# Patient Record
Sex: MALE | Race: WHITE | Employment: OTHER | URBAN - METROPOLITAN AREA
[De-identification: names, ages, dates, MRNs, and addresses within clinical notes are randomized per-mention and may not be internally consistent; named-entity substitution may affect disease eponyms.]

---

## 2021-08-24 ENCOUNTER — OFFICE VISIT (OUTPATIENT)
Dept: ENT CLINIC | Age: 86
End: 2021-08-24
Payer: MEDICARE

## 2021-08-24 VITALS
OXYGEN SATURATION: 98 % | TEMPERATURE: 98.1 F | RESPIRATION RATE: 18 BRPM | HEIGHT: 72 IN | DIASTOLIC BLOOD PRESSURE: 80 MMHG | BODY MASS INDEX: 30.34 KG/M2 | HEART RATE: 75 BPM | SYSTOLIC BLOOD PRESSURE: 124 MMHG | WEIGHT: 224 LBS

## 2021-08-24 DIAGNOSIS — H90.3 SENSORINEURAL HEARING LOSS (SNHL) OF BOTH EARS: Primary | ICD-10-CM

## 2021-08-24 PROCEDURE — G8432 DEP SCR NOT DOC, RNG: HCPCS | Performed by: OTOLARYNGOLOGY

## 2021-08-24 PROCEDURE — 99203 OFFICE O/P NEW LOW 30 MIN: CPT | Performed by: OTOLARYNGOLOGY

## 2021-08-24 PROCEDURE — 1101F PT FALLS ASSESS-DOCD LE1/YR: CPT | Performed by: OTOLARYNGOLOGY

## 2021-08-24 PROCEDURE — G8417 CALC BMI ABV UP PARAM F/U: HCPCS | Performed by: OTOLARYNGOLOGY

## 2021-08-24 PROCEDURE — G8536 NO DOC ELDER MAL SCRN: HCPCS | Performed by: OTOLARYNGOLOGY

## 2021-08-24 PROCEDURE — G8427 DOCREV CUR MEDS BY ELIG CLIN: HCPCS | Performed by: OTOLARYNGOLOGY

## 2021-08-24 RX ORDER — CETIRIZINE HCL 10 MG
TABLET ORAL
COMMUNITY

## 2021-08-24 RX ORDER — DICLOFENAC SODIUM 10 MG/G
GEL TOPICAL
COMMUNITY

## 2021-08-24 RX ORDER — AMLODIPINE BESYLATE 10 MG/1
TABLET ORAL
COMMUNITY
Start: 2020-09-18

## 2021-08-24 RX ORDER — BENZONATATE 100 MG/1
CAPSULE ORAL
COMMUNITY

## 2021-08-24 RX ORDER — FLUTICASONE PROPIONATE 50 MCG
SPRAY, SUSPENSION (ML) NASAL
COMMUNITY
Start: 2021-06-12

## 2021-08-24 RX ORDER — LISINOPRIL AND HYDROCHLOROTHIAZIDE 12.5; 2 MG/1; MG/1
TABLET ORAL
COMMUNITY

## 2021-08-24 RX ORDER — CEFDINIR 300 MG/1
CAPSULE ORAL
COMMUNITY

## 2021-08-24 RX ORDER — ASPIRIN 81 MG/1
81 TABLET ORAL DAILY
COMMUNITY

## 2021-08-24 RX ORDER — VALACYCLOVIR HYDROCHLORIDE 1 G/1
TABLET, FILM COATED ORAL
COMMUNITY

## 2021-08-24 NOTE — LETTER
8/24/2021    Patient: Gisel Avelar. YOB: 1933   Date of Visit: 8/24/2021     Nikki Freeman MD  [address] [place] [address]  Via Fax: [phone]    Dear Nikki Freeman MD,      Thank you for referring Mr. Herb Leyden to [facility] for evaluation. My notes for this consultation are attached. If you have questions, please do not hesitate to call me. I look forward to following your patient along with you.       Sincerely,    Christa Morocho MD

## 2022-09-29 ENCOUNTER — TRANSCRIBE ORDER (OUTPATIENT)
Dept: SCHEDULING | Age: 87
End: 2022-09-29

## 2022-09-29 DIAGNOSIS — I50.9 HEART FAILURE, UNSPECIFIED (HCC): Primary | ICD-10-CM

## 2022-09-29 DIAGNOSIS — R06.00 DYSPNEA AND RESPIRATORY ABNORMALITY: ICD-10-CM

## 2022-09-29 DIAGNOSIS — R06.89 DYSPNEA AND RESPIRATORY ABNORMALITY: ICD-10-CM

## 2022-10-10 ENCOUNTER — HOSPITAL ENCOUNTER (OUTPATIENT)
Dept: VASCULAR SURGERY | Age: 87
Discharge: HOME | End: 2022-10-10
Payer: MEDICARE

## 2022-10-10 VITALS — WEIGHT: 225 LBS | HEIGHT: 76 IN | BODY MASS INDEX: 27.4 KG/M2

## 2022-10-10 DIAGNOSIS — I50.9 HEART FAILURE, UNSPECIFIED (HCC): ICD-10-CM

## 2022-10-10 DIAGNOSIS — R06.89 DYSPNEA AND RESPIRATORY ABNORMALITY: ICD-10-CM

## 2022-10-10 DIAGNOSIS — R06.00 DYSPNEA AND RESPIRATORY ABNORMALITY: ICD-10-CM

## 2022-10-10 LAB
ECHO AO ROOT DIAM: 4.2 CM
ECHO AO ROOT INDEX: 1.8 CM/M2
ECHO AV AREA PEAK VELOCITY: 3.6 CM2
ECHO AV AREA VTI: 4.1 CM2
ECHO AV AREA/BSA PEAK VELOCITY: 1.5 CM2/M2
ECHO AV AREA/BSA VTI: 1.8 CM2/M2
ECHO AV MEAN GRADIENT: 12 MMHG
ECHO AV MEAN VELOCITY: 1.7 M/S
ECHO AV PEAK GRADIENT: 21 MMHG
ECHO AV PEAK VELOCITY: 2.3 M/S
ECHO AV VELOCITY RATIO: 0.48
ECHO AV VTI: 47.9 CM
ECHO LA DIAMETER INDEX: 1.55 CM/M2
ECHO LA DIAMETER: 3.6 CM
ECHO LA TO AORTIC ROOT RATIO: 0.86
ECHO LA VOL 2C: 26 ML (ref 18–58)
ECHO LA VOL 4C: 44 ML (ref 18–58)
ECHO LA VOL BP: 34 ML (ref 18–58)
ECHO LA VOL/BSA BIPLANE: 15 ML/M2 (ref 16–34)
ECHO LA VOLUME AREA LENGTH: 35 ML
ECHO LA VOLUME INDEX A2C: 11 ML/M2 (ref 16–34)
ECHO LA VOLUME INDEX A4C: 19 ML/M2 (ref 16–34)
ECHO LA VOLUME INDEX AREA LENGTH: 15 ML/M2 (ref 16–34)
ECHO LV E' LATERAL VELOCITY: 7 CM/S
ECHO LV E' SEPTAL VELOCITY: 8 CM/S
ECHO LV EDV A2C: 44 ML
ECHO LV EDV A4C: 63 ML
ECHO LV EDV BP: 54 ML (ref 67–155)
ECHO LV EDV INDEX A4C: 27 ML/M2
ECHO LV EDV INDEX BP: 23 ML/M2
ECHO LV EDV NDEX A2C: 19 ML/M2
ECHO LV EJECTION FRACTION A2C: 78 %
ECHO LV EJECTION FRACTION A4C: 59 %
ECHO LV EJECTION FRACTION BIPLANE: 69 % (ref 55–100)
ECHO LV ESV A2C: 10 ML
ECHO LV ESV A4C: 26 ML
ECHO LV ESV BP: 17 ML (ref 22–58)
ECHO LV ESV INDEX A2C: 4 ML/M2
ECHO LV ESV INDEX A4C: 11 ML/M2
ECHO LV ESV INDEX BP: 7 ML/M2
ECHO LV FRACTIONAL SHORTENING: 36 % (ref 28–44)
ECHO LV GLOBAL LONGITUDINAL STRAIN (GLS): -16.1 %
ECHO LV INTERNAL DIMENSION DIASTOLE INDEX: 1.67 CM/M2
ECHO LV INTERNAL DIMENSION DIASTOLIC: 3.9 CM (ref 4.2–5.9)
ECHO LV INTERNAL DIMENSION SYSTOLIC INDEX: 1.07 CM/M2
ECHO LV INTERNAL DIMENSION SYSTOLIC: 2.5 CM
ECHO LV IVSD: 0.9 CM (ref 0.6–1)
ECHO LV MASS 2D: 122.1 G (ref 88–224)
ECHO LV MASS INDEX 2D: 52.4 G/M2 (ref 49–115)
ECHO LV POSTERIOR WALL DIASTOLIC: 1.1 CM (ref 0.6–1)
ECHO LV RELATIVE WALL THICKNESS RATIO: 0.56
ECHO LVOT AREA: 7.1 CM2
ECHO LVOT AV VTI INDEX: 0.57
ECHO LVOT DIAM: 3 CM
ECHO LVOT MEAN GRADIENT: 3 MMHG
ECHO LVOT PEAK GRADIENT: 5 MMHG
ECHO LVOT PEAK VELOCITY: 1.1 M/S
ECHO LVOT STROKE VOLUME INDEX: 83.4 ML/M2
ECHO LVOT SV: 194.3 ML
ECHO LVOT VTI: 27.5 CM
ECHO MV A VELOCITY: 1.1 M/S
ECHO MV E DECELERATION TIME (DT): 193.2 MS
ECHO MV E VELOCITY: 0.78 M/S
ECHO MV E/A RATIO: 0.71
ECHO MV E/E' LATERAL: 11.14
ECHO MV E/E' RATIO (AVERAGED): 10.45
ECHO MV E/E' SEPTAL: 9.75
ECHO RA AREA 4C: 11.6 CM2
ECHO RV FREE WALL PEAK S': 13 CM/S
ECHO RV INTERNAL DIMENSION: 3 CM
ECHO RV TAPSE: 2.5 CM (ref 1.7–?)

## 2022-10-10 PROCEDURE — 93306 TTE W/DOPPLER COMPLETE: CPT

## 2022-10-26 ENCOUNTER — TRANSCRIBE ORDER (OUTPATIENT)
Dept: SCHEDULING | Age: 87
End: 2022-10-26

## 2022-10-26 DIAGNOSIS — R60.0 LEG EDEMA: ICD-10-CM

## 2022-10-26 DIAGNOSIS — R79.89 ELEVATED D-DIMER: ICD-10-CM

## 2022-10-26 DIAGNOSIS — R06.02 SHORTNESS OF BREATH: Primary | ICD-10-CM

## 2022-10-26 DIAGNOSIS — R79.89 D-DIMER, ELEVATED: ICD-10-CM

## 2022-10-28 ENCOUNTER — HOSPITAL ENCOUNTER (OUTPATIENT)
Dept: CT IMAGING | Age: 87
Discharge: HOME | End: 2022-10-28
Payer: MEDICARE

## 2022-10-28 ENCOUNTER — TRANSCRIBE ORDER (OUTPATIENT)
Dept: REGISTRATION | Age: 87
End: 2022-10-28

## 2022-10-28 ENCOUNTER — HOSPITAL ENCOUNTER (OUTPATIENT)
Dept: VASCULAR SURGERY | Age: 87
Discharge: HOME | End: 2022-10-28
Payer: MEDICARE

## 2022-10-28 DIAGNOSIS — R79.89 POSITIVE D-DIMER: ICD-10-CM

## 2022-10-28 DIAGNOSIS — R79.89 ELEVATED D-DIMER: ICD-10-CM

## 2022-10-28 DIAGNOSIS — I82.431 EMBOLISM AND THROMBOSIS OF RIGHT POPLITEAL VEIN (HCC): ICD-10-CM

## 2022-10-28 DIAGNOSIS — R06.02 SHORTNESS OF BREATH: ICD-10-CM

## 2022-10-28 DIAGNOSIS — R79.89 D-DIMER, ELEVATED: ICD-10-CM

## 2022-10-28 DIAGNOSIS — R06.09 DYSPNEA ON EXERTION: ICD-10-CM

## 2022-10-28 DIAGNOSIS — R60.0 LEG EDEMA: ICD-10-CM

## 2022-10-28 DIAGNOSIS — I82.431 EMBOLISM AND THROMBOSIS OF RIGHT POPLITEAL VEIN (HCC): Primary | ICD-10-CM

## 2022-10-28 PROCEDURE — 71250 CT THORAX DX C-: CPT

## 2022-10-28 PROCEDURE — 74011000636 HC RX REV CODE- 636

## 2022-10-28 PROCEDURE — 71275 CT ANGIOGRAPHY CHEST: CPT

## 2022-10-28 PROCEDURE — 93970 EXTREMITY STUDY: CPT

## 2022-10-28 RX ADMIN — IOPAMIDOL 100 ML: 755 INJECTION, SOLUTION INTRAVENOUS at 13:00

## 2022-11-22 ENCOUNTER — OFFICE VISIT (OUTPATIENT)
Dept: ENT CLINIC | Age: 87
End: 2022-11-22
Payer: MEDICARE

## 2022-11-22 VITALS
WEIGHT: 225 LBS | DIASTOLIC BLOOD PRESSURE: 82 MMHG | BODY MASS INDEX: 26.57 KG/M2 | OXYGEN SATURATION: 97 % | HEIGHT: 77 IN | RESPIRATION RATE: 17 BRPM | HEART RATE: 91 BPM | SYSTOLIC BLOOD PRESSURE: 122 MMHG

## 2022-11-22 DIAGNOSIS — H61.23 BILATERAL IMPACTED CERUMEN: Primary | ICD-10-CM

## 2022-11-22 PROCEDURE — 69210 REMOVE IMPACTED EAR WAX UNI: CPT | Performed by: OTOLARYNGOLOGY

## 2022-11-22 NOTE — PROGRESS NOTES
1 year followup ear cleaning  Ears impacted AU      Procedure - Removal Impacted Cerumen    Indications: cerumen impaction    Ears are examined under microscope/headlight.  bilateral ears are cleaned using otologic curette, suction, and/or alligator forceps.     Fu 1 year

## 2023-01-24 ENCOUNTER — TRANSCRIBE ORDER (OUTPATIENT)
Dept: SCHEDULING | Age: 88
End: 2023-01-24

## 2023-01-24 ENCOUNTER — HOSPITAL ENCOUNTER (OUTPATIENT)
Dept: VASCULAR SURGERY | Age: 88
Discharge: HOME | End: 2023-01-24
Payer: MEDICARE

## 2023-01-24 DIAGNOSIS — I82.531 CHRONIC EMBOLISM AND THROMBOSIS OF RIGHT POPLITEAL VEIN (HCC): Primary | ICD-10-CM

## 2023-01-24 DIAGNOSIS — I82.531 CHRONIC EMBOLISM AND THROMBOSIS OF RIGHT POPLITEAL VEIN (HCC): ICD-10-CM

## 2023-01-24 PROCEDURE — 93971 EXTREMITY STUDY: CPT

## 2023-05-20 RX ORDER — BENZONATATE 100 MG/1
CAPSULE ORAL
COMMUNITY

## 2023-05-20 RX ORDER — CEFDINIR 300 MG/1
CAPSULE ORAL
COMMUNITY

## 2023-05-20 RX ORDER — LISINOPRIL AND HYDROCHLOROTHIAZIDE 20; 12.5 MG/1; MG/1
TABLET ORAL
COMMUNITY

## 2023-05-20 RX ORDER — AMLODIPINE BESYLATE 10 MG/1
TABLET ORAL
COMMUNITY
Start: 2020-09-18

## 2023-05-20 RX ORDER — VALACYCLOVIR HYDROCHLORIDE 1 G/1
TABLET, FILM COATED ORAL
COMMUNITY

## 2023-05-20 RX ORDER — CETIRIZINE HYDROCHLORIDE 10 MG/1
TABLET ORAL
COMMUNITY

## 2023-05-20 RX ORDER — ASPIRIN 81 MG/1
81 TABLET ORAL DAILY
COMMUNITY

## 2023-05-20 RX ORDER — FLUTICASONE PROPIONATE 50 MCG
SPRAY, SUSPENSION (ML) NASAL
COMMUNITY
Start: 2021-06-12

## 2023-09-07 ENCOUNTER — TELEPHONE (OUTPATIENT)
Age: 88
End: 2023-09-07

## 2023-09-07 NOTE — TELEPHONE ENCOUNTER
Lvm for pt letting him know that Dr. Heriberto Kolb is no longer going to the [address] [place] [address] and that his appt on 11/28 needs to be r/s either to [place] with Dr. Heriberto Kolb or another provider at the [address] [place] [address]. Left the office number for the pt contact to let us know what he would like to do.

## 2023-10-09 ENCOUNTER — APPOINTMENT (OUTPATIENT)
Facility: HOSPITAL | Age: 88
End: 2023-10-09
Attending: EMERGENCY MEDICINE
Payer: MEDICARE

## 2023-10-09 ENCOUNTER — HOSPITAL ENCOUNTER (EMERGENCY)
Facility: HOSPITAL | Age: 88
Discharge: ANOTHER HOSPITAL | End: 2023-10-09
Attending: EMERGENCY MEDICINE
Payer: MEDICARE

## 2023-10-09 VITALS
RESPIRATION RATE: 17 BRPM | HEART RATE: 76 BPM | DIASTOLIC BLOOD PRESSURE: 78 MMHG | WEIGHT: 225 LBS | OXYGEN SATURATION: 100 % | HEIGHT: 76 IN | BODY MASS INDEX: 27.4 KG/M2 | TEMPERATURE: 98.4 F | SYSTOLIC BLOOD PRESSURE: 158 MMHG

## 2023-10-09 DIAGNOSIS — T07.XXXA ABRASIONS OF MULTIPLE SITES: ICD-10-CM
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
## 2023-10-09 DIAGNOSIS — S12.091A OTHER CLOSED NONDISPLACED FRACTURE OF FIRST CERVICAL VERTEBRA, INITIAL ENCOUNTER (HCC): Primary | ICD-10-CM

## 2023-10-09 PROCEDURE — 96375 TX/PRO/DX INJ NEW DRUG ADDON: CPT

## 2023-10-09 PROCEDURE — 6370000000 HC RX 637 (ALT 250 FOR IP): Performed by: EMERGENCY MEDICINE

## 2023-10-09 PROCEDURE — 96374 THER/PROPH/DIAG INJ IV PUSH: CPT

## 2023-10-09 PROCEDURE — 99285 EMERGENCY DEPT VISIT HI MDM: CPT

## 2023-10-09 PROCEDURE — 72125 CT NECK SPINE W/O DYE: CPT

## 2023-10-09 PROCEDURE — 6360000002 HC RX W HCPCS

## 2023-10-09 PROCEDURE — 70450 CT HEAD/BRAIN W/O DYE: CPT

## 2023-10-09 RX ORDER — ACETAMINOPHEN 325 MG/1
650 TABLET ORAL
Status: COMPLETED | OUTPATIENT
Start: 2023-10-09 | End: 2023-10-09

## 2023-10-09 RX ORDER — KETOROLAC TROMETHAMINE 30 MG/ML
30 INJECTION, SOLUTION INTRAMUSCULAR; INTRAVENOUS
Status: COMPLETED | OUTPATIENT
Start: 2023-10-09 | End: 2023-10-09

## 2023-10-09 RX ORDER — KETOROLAC TROMETHAMINE 30 MG/ML
INJECTION, SOLUTION INTRAMUSCULAR; INTRAVENOUS
Status: COMPLETED
Start: 2023-10-09 | End: 2023-10-09

## 2023-10-09 RX ORDER — BACITRACIN ZINC 500 [USP'U]/G
OINTMENT TOPICAL
Status: COMPLETED | OUTPATIENT
Start: 2023-10-09 | End: 2023-10-09

## 2023-10-09 RX ORDER — ONDANSETRON 2 MG/ML
4 INJECTION INTRAMUSCULAR; INTRAVENOUS EVERY 6 HOURS PRN
Status: DISCONTINUED | OUTPATIENT
Start: 2023-10-09 | End: 2023-10-09 | Stop reason: HOSPADM

## 2023-10-09 RX ORDER — ONDANSETRON 2 MG/ML
INJECTION INTRAMUSCULAR; INTRAVENOUS
Status: COMPLETED
Start: 2023-10-09 | End: 2023-10-09

## 2023-10-09 RX ADMIN — ACETAMINOPHEN 650 MG: 325 TABLET, FILM COATED ORAL at 17:01

## 2023-10-09 RX ADMIN — ONDANSETRON 4 MG: 2 INJECTION INTRAMUSCULAR; INTRAVENOUS at 17:17

## 2023-10-09 RX ADMIN — KETOROLAC TROMETHAMINE 30 MG: 30 INJECTION, SOLUTION INTRAMUSCULAR at 17:17

## 2023-10-09 RX ADMIN — KETOROLAC TROMETHAMINE 30 MG: 30 INJECTION, SOLUTION INTRAMUSCULAR; INTRAVENOUS at 17:17

## 2023-10-09 RX ADMIN — BACITRACIN ZINC: 500 OINTMENT TOPICAL at 15:44

## 2023-10-09 ASSESSMENT — PAIN - FUNCTIONAL ASSESSMENT: PAIN_FUNCTIONAL_ASSESSMENT: 0-10

## 2023-10-09 ASSESSMENT — ENCOUNTER SYMPTOMS
EYES NEGATIVE: 1
ALLERGIC/IMMUNOLOGIC NEGATIVE: 1
RESPIRATORY NEGATIVE: 1

## 2023-10-09 ASSESSMENT — PAIN SCALES - GENERAL: PAINLEVEL_OUTOF10: 2

## 2023-10-09 NOTE — ED PROVIDER NOTES
[facility] EMERGENCY DEPT  EMERGENCY DEPARTMENT ENCOUNTER      Pt Name: Farooq Ochoa MRN: [id number]  Birthdate 10/1/1933  Date of evaluation: 10/9/2023  Provider: Ramesh Rosenberg MD    CHIEF COMPLAINT       Chief Complaint   Patient presents with    Fall         HISTORY OF PRESENT ILLNESS   (Location/Symptom, Timing/Onset, Context/Setting, Quality, Duration, Modifying Factors, Severity)  Note limiting factors. Farooq Ochoa is a 80 y.o. male who presents to the emergency department suffered a ground-level fall in the bathroom at [facility] when his cane slipped out from under him struck his head no loss of consciousness complains of minor head injury neck pain and abrasions to hand and right elbow. No other complaints    HPI    Nursing Notes were reviewed. REVIEW OF SYSTEMS    (2-9 systems for level 4, 10 or more for level 5)     Review of Systems   Constitutional:  Positive for fatigue. Negative for activity change, appetite change, chills, diaphoresis, fever and unexpected weight change. Patient has been experiencing chronic fatigue since his diagnosis of COVID over 1 year ago   HENT: Negative. Eyes: Negative. Respiratory: Negative. Cardiovascular: Negative. Endocrine: Negative. Genitourinary: Negative. Musculoskeletal:  Positive for neck pain. Allergic/Immunologic: Negative. Neurological: Negative. Hematological: Negative. Psychiatric/Behavioral: Negative. Except as noted above the remainder of the review of systems was reviewed and negative. PAST MEDICAL HISTORY     Past Medical History:   Diagnosis Date    HTN (hypertension)          SURGICAL HISTORY     No past surgical history on file.       CURRENT MEDICATIONS       Previous Medications    AMLODIPINE (NORVASC) 10 MG TABLET    amlodipine 10 mg tablet    APIXABAN (ELIQUIS) 5 MG TABS TABLET    Take 1 tablet by mouth 2 times daily    ASPIRIN 81 MG EC TABLET    Take 1 tablet by mouth daily

## 2023-10-09 NOTE — ED NOTES
Bacitracin applied to back of head right  eyebrow right elbow and top of right hand after cleansing with warm water and wash cloth. Bandages applied to all as well after cleaning performed.      Oneil Palacios RN  10/09/23 1543

## 2023-10-09 NOTE — ED NOTES
Report given to [facility] at this time for transport to [address] [place] [address], Pedrito Cooper, LINUS  10/09/23 6112

## 2023-10-09 NOTE — ED TRIAGE NOTES
Presents to ER with c/o ground of level fall in bathroom at [facility]. States he \"slipped\" in bathroom. Abrasion noted to back of head, abrasion and hematoma above right eye,, large skin tear to right elbow, and abrasions to right hand/knuckles. Denies LOC. Denies pain in any other location. C-collar placed pta by EMS.

## 2024-05-14 ENCOUNTER — OFFICE VISIT (OUTPATIENT)
Age: 89
End: 2024-05-14

## 2024-05-14 VITALS
HEART RATE: 91 BPM | DIASTOLIC BLOOD PRESSURE: 90 MMHG | RESPIRATION RATE: 18 BRPM | BODY MASS INDEX: 27.4 KG/M2 | HEIGHT: 76 IN | OXYGEN SATURATION: 95 % | WEIGHT: 225 LBS | SYSTOLIC BLOOD PRESSURE: 140 MMHG

## 2024-05-14 DIAGNOSIS — H61.23 IMPACTED CERUMEN, BILATERAL: Primary | ICD-10-CM

## 2024-05-15 NOTE — PROGRESS NOTES
Tyesha David Jr.  10/1/1933  [id number]    5/14/2024    Hx of bilateral cerumen impactions  Has hearing aids with outside audiologist  Last seen by Dr Chang 2022    PROCEDURE: BILATERAL CERUMEN DEBRIDEMENT    Using the headlight and otoscope both ears were examined. A 5 Fr suction and alligator were used to debride the right ear of cerumen revealing a clear and intact TM bilaterally. Patient tolerated the procedure well     A/P   Cerumen impactions  Hearing loss  R ear complete impaction debrided  Follow up 1 year or PRN     Paula Franco MD   [facility]  [address]  [place] [address]  Office Phone: [phone]

## 2024-08-05 ENCOUNTER — APPOINTMENT (OUTPATIENT)
Facility: HOSPITAL | Age: 89
End: 2024-08-05
Payer: MEDICARE

## 2024-08-05 ENCOUNTER — HOSPITAL ENCOUNTER (INPATIENT)
Facility: HOSPITAL | Age: 89
LOS: 4 days | Discharge: NURSING, REHAB OR LONG-TERM CARE | End: 2024-08-09
Admitting: INTERNAL MEDICINE
Payer: MEDICARE

## 2024-08-05 ENCOUNTER — HOSPITAL ENCOUNTER (EMERGENCY)
Facility: HOSPITAL | Age: 89
Discharge: ANOTHER HOSPITAL | End: 2024-08-05
Attending: EMERGENCY MEDICINE
Payer: MEDICARE

## 2024-08-05 VITALS
DIASTOLIC BLOOD PRESSURE: 46 MMHG | OXYGEN SATURATION: 91 % | TEMPERATURE: 97.4 F | HEIGHT: 76 IN | RESPIRATION RATE: 18 BRPM | WEIGHT: 237 LBS | SYSTOLIC BLOOD PRESSURE: 118 MMHG | HEART RATE: 69 BPM | BODY MASS INDEX: 28.86 KG/M2

## 2024-08-05 DIAGNOSIS — S42.025A CLOSED NONDISPLACED FRACTURE OF SHAFT OF LEFT CLAVICLE, INITIAL ENCOUNTER: ICD-10-CM

## 2024-08-05 DIAGNOSIS — S32.599A CLOSED FRACTURE OF PUBIC RAMUS, UNSPECIFIED LATERALITY, INITIAL ENCOUNTER (HCC): Primary | ICD-10-CM

## 2024-08-05 DIAGNOSIS — S32.511A CLOSED FRACTURE OF SUPERIOR RAMUS OF RIGHT PUBIS, INITIAL ENCOUNTER (HCC): Primary | ICD-10-CM

## 2024-08-05 PROBLEM — T14.8XXA FRACTURE: Status: ACTIVE | Noted: 2024-08-05

## 2024-08-05 LAB
ALBUMIN SERPL-MCNC: 3.8 G/DL (ref 3.5–5)
ALBUMIN/GLOB SERPL: 1.3 (ref 1.1–2.2)
ALP SERPL-CCNC: 76 U/L (ref 45–117)
ALT SERPL-CCNC: 16 U/L (ref 12–78)
ANION GAP SERPL CALC-SCNC: 8 MMOL/L (ref 5–15)
APPEARANCE UR: CLEAR
AST SERPL W P-5'-P-CCNC: 24 U/L (ref 15–37)
BACTERIA URNS QL MICRO: NEGATIVE /HPF
BASOPHILS # BLD: 0.1 K/UL (ref 0–0.1)
BASOPHILS NFR BLD: 1 % (ref 0–1)
BILIRUB SERPL-MCNC: 1.5 MG/DL (ref 0.2–1)
BILIRUB UR QL: NEGATIVE
BUN SERPL-MCNC: 19 MG/DL (ref 6–20)
BUN/CREAT SERPL: 15 (ref 12–20)
CA-I BLD-MCNC: 8.8 MG/DL (ref 8.5–10.1)
CHLORIDE SERPL-SCNC: 102 MMOL/L (ref 97–108)
CO2 SERPL-SCNC: 29 MMOL/L (ref 21–32)
COLOR UR: ABNORMAL
CREAT SERPL-MCNC: 1.3 MG/DL (ref 0.7–1.3)
DIFFERENTIAL METHOD BLD: ABNORMAL
EOSINOPHIL # BLD: 0.2 K/UL (ref 0–0.4)
EOSINOPHIL NFR BLD: 2 % (ref 0–7)
ERYTHROCYTE [DISTWIDTH] IN BLOOD BY AUTOMATED COUNT: 13.1 % (ref 11.5–14.5)
GLOBULIN SER CALC-MCNC: 2.9 G/DL (ref 2–4)
GLUCOSE SERPL-MCNC: 130 MG/DL (ref 65–100)
GLUCOSE UR STRIP.AUTO-MCNC: NEGATIVE MG/DL
HCT VFR BLD AUTO: 38.4 % (ref 36.6–50.3)
HGB BLD-MCNC: 13.2 G/DL (ref 12.1–17)
HGB UR QL STRIP: ABNORMAL
IMM GRANULOCYTES # BLD AUTO: 0 K/UL (ref 0–0.04)
IMM GRANULOCYTES NFR BLD AUTO: 0 % (ref 0–0.5)
KETONES UR QL STRIP.AUTO: NEGATIVE MG/DL
LEUKOCYTE ESTERASE UR QL STRIP.AUTO: NEGATIVE
LYMPHOCYTES # BLD: 0.6 K/UL (ref 0.8–3.5)
LYMPHOCYTES NFR BLD: 7 % (ref 12–49)
MAGNESIUM SERPL-MCNC: 1.6 MG/DL (ref 1.6–2.4)
MCH RBC QN AUTO: 31.4 PG (ref 26–34)
MCHC RBC AUTO-ENTMCNC: 34.4 G/DL (ref 30–36.5)
MCV RBC AUTO: 91.2 FL (ref 80–99)
MONOCYTES # BLD: 0.7 K/UL (ref 0–1)
MONOCYTES NFR BLD: 8 % (ref 5–13)
NEUTS SEG # BLD: 7.5 K/UL (ref 1.8–8)
NEUTS SEG NFR BLD: 82 % (ref 32–75)
NITRITE UR QL STRIP.AUTO: NEGATIVE
NRBC # BLD: 0 K/UL (ref 0–0.01)
NRBC BLD-RTO: 0 PER 100 WBC
PH UR STRIP: 6.5 (ref 5–8)
PLATELET # BLD AUTO: 169 K/UL (ref 150–400)
PMV BLD AUTO: 11.5 FL (ref 8.9–12.9)
POTASSIUM SERPL-SCNC: 3.8 MMOL/L (ref 3.5–5.1)
PROT SERPL-MCNC: 6.7 G/DL (ref 6.4–8.2)
PROT UR STRIP-MCNC: 30 MG/DL
RBC # BLD AUTO: 4.21 M/UL (ref 4.1–5.7)
RBC #/AREA URNS HPF: ABNORMAL /HPF (ref 0–3)
RBC MORPH BLD: ABNORMAL
SODIUM SERPL-SCNC: 139 MMOL/L (ref 136–145)
SP GR UR REFRACTOMETRY: 1.02 (ref 1–1.03)
UROBILINOGEN UR QL STRIP.AUTO: 1 EU/DL (ref 0.2–1)
WBC # BLD AUTO: 9.1 K/UL (ref 4.1–11.1)
WBC URNS QL MICRO: ABNORMAL /HPF (ref 0–5)

## 2024-08-05 PROCEDURE — 96374 THER/PROPH/DIAG INJ IV PUSH: CPT

## 2024-08-05 PROCEDURE — 99285 EMERGENCY DEPT VISIT HI MDM: CPT

## 2024-08-05 PROCEDURE — 70450 CT HEAD/BRAIN W/O DYE: CPT

## 2024-08-05 PROCEDURE — 72131 CT LUMBAR SPINE W/O DYE: CPT

## 2024-08-05 PROCEDURE — 73030 X-RAY EXAM OF SHOULDER: CPT

## 2024-08-05 PROCEDURE — 6360000002 HC RX W HCPCS: Performed by: EMERGENCY MEDICINE

## 2024-08-05 PROCEDURE — 72192 CT PELVIS W/O DYE: CPT

## 2024-08-05 PROCEDURE — 83735 ASSAY OF MAGNESIUM: CPT

## 2024-08-05 PROCEDURE — 71045 X-RAY EXAM CHEST 1 VIEW: CPT

## 2024-08-05 PROCEDURE — 81001 URINALYSIS AUTO W/SCOPE: CPT

## 2024-08-05 PROCEDURE — 36415 COLL VENOUS BLD VENIPUNCTURE: CPT

## 2024-08-05 PROCEDURE — 72125 CT NECK SPINE W/O DYE: CPT

## 2024-08-05 PROCEDURE — 96375 TX/PRO/DX INJ NEW DRUG ADDON: CPT

## 2024-08-05 PROCEDURE — 80053 COMPREHEN METABOLIC PANEL: CPT

## 2024-08-05 PROCEDURE — 85025 COMPLETE CBC W/AUTO DIFF WBC: CPT

## 2024-08-05 PROCEDURE — 1100000000 HC RM PRIVATE

## 2024-08-05 RX ORDER — ONDANSETRON 2 MG/ML
4 INJECTION INTRAMUSCULAR; INTRAVENOUS ONCE
Status: COMPLETED | OUTPATIENT
Start: 2024-08-05 | End: 2024-08-05

## 2024-08-05 RX ORDER — MORPHINE SULFATE 4 MG/ML
4 INJECTION, SOLUTION INTRAMUSCULAR; INTRAVENOUS
Status: COMPLETED | OUTPATIENT
Start: 2024-08-05 | End: 2024-08-05

## 2024-08-05 RX ORDER — ASPIRIN 81 MG/1
81 TABLET ORAL DAILY
Status: DISCONTINUED | OUTPATIENT
Start: 2024-08-06 | End: 2024-08-09 | Stop reason: HOSPADM

## 2024-08-05 RX ORDER — HYDROCODONE BITARTRATE AND ACETAMINOPHEN 10; 325 MG/1; MG/1
1 TABLET ORAL EVERY 4 HOURS PRN
Status: DISCONTINUED | OUTPATIENT
Start: 2024-08-05 | End: 2024-08-09 | Stop reason: HOSPADM

## 2024-08-05 RX ORDER — SODIUM CHLORIDE 0.9 % (FLUSH) 0.9 %
5-40 SYRINGE (ML) INJECTION PRN
Status: DISCONTINUED | OUTPATIENT
Start: 2024-08-05 | End: 2024-08-09 | Stop reason: HOSPADM

## 2024-08-05 RX ORDER — POLYETHYLENE GLYCOL 3350 17 G/17G
17 POWDER, FOR SOLUTION ORAL DAILY PRN
Status: DISCONTINUED | OUTPATIENT
Start: 2024-08-05 | End: 2024-08-09 | Stop reason: HOSPADM

## 2024-08-05 RX ORDER — HYDROCODONE BITARTRATE AND ACETAMINOPHEN 5; 325 MG/1; MG/1
1 TABLET ORAL EVERY 4 HOURS PRN
Status: DISCONTINUED | OUTPATIENT
Start: 2024-08-05 | End: 2024-08-09 | Stop reason: HOSPADM

## 2024-08-05 RX ORDER — MAGNESIUM SULFATE IN WATER 40 MG/ML
2000 INJECTION, SOLUTION INTRAVENOUS PRN
Status: DISCONTINUED | OUTPATIENT
Start: 2024-08-05 | End: 2024-08-09 | Stop reason: HOSPADM

## 2024-08-05 RX ORDER — ONDANSETRON 4 MG/1
4 TABLET, ORALLY DISINTEGRATING ORAL EVERY 8 HOURS PRN
Status: DISCONTINUED | OUTPATIENT
Start: 2024-08-05 | End: 2024-08-09 | Stop reason: HOSPADM

## 2024-08-05 RX ORDER — POTASSIUM CHLORIDE 7.45 MG/ML
10 INJECTION INTRAVENOUS PRN
Status: DISCONTINUED | OUTPATIENT
Start: 2024-08-05 | End: 2024-08-09 | Stop reason: HOSPADM

## 2024-08-05 RX ORDER — SODIUM CHLORIDE 9 MG/ML
INJECTION, SOLUTION INTRAVENOUS PRN
Status: DISCONTINUED | OUTPATIENT
Start: 2024-08-05 | End: 2024-08-09 | Stop reason: HOSPADM

## 2024-08-05 RX ORDER — SODIUM CHLORIDE 0.9 % (FLUSH) 0.9 %
5-40 SYRINGE (ML) INJECTION EVERY 12 HOURS SCHEDULED
Status: DISCONTINUED | OUTPATIENT
Start: 2024-08-05 | End: 2024-08-09 | Stop reason: HOSPADM

## 2024-08-05 RX ORDER — POTASSIUM CHLORIDE 20 MEQ/1
40 TABLET, EXTENDED RELEASE ORAL PRN
Status: DISCONTINUED | OUTPATIENT
Start: 2024-08-05 | End: 2024-08-09 | Stop reason: HOSPADM

## 2024-08-05 RX ORDER — ACETAMINOPHEN 325 MG/1
650 TABLET ORAL EVERY 6 HOURS PRN
Status: DISCONTINUED | OUTPATIENT
Start: 2024-08-05 | End: 2024-08-09 | Stop reason: HOSPADM

## 2024-08-05 RX ORDER — AMLODIPINE BESYLATE 5 MG/1
10 TABLET ORAL DAILY
Status: DISCONTINUED | OUTPATIENT
Start: 2024-08-06 | End: 2024-08-09 | Stop reason: HOSPADM

## 2024-08-05 RX ORDER — ONDANSETRON 2 MG/ML
4 INJECTION INTRAMUSCULAR; INTRAVENOUS EVERY 6 HOURS PRN
Status: DISCONTINUED | OUTPATIENT
Start: 2024-08-05 | End: 2024-08-09 | Stop reason: HOSPADM

## 2024-08-05 RX ORDER — LISINOPRIL AND HYDROCHLOROTHIAZIDE 20; 12.5 MG/1; MG/1
1 TABLET ORAL DAILY
Status: DISCONTINUED | OUTPATIENT
Start: 2024-08-06 | End: 2024-08-06

## 2024-08-05 RX ORDER — ACETAMINOPHEN 650 MG/1
650 SUPPOSITORY RECTAL EVERY 6 HOURS PRN
Status: DISCONTINUED | OUTPATIENT
Start: 2024-08-05 | End: 2024-08-09 | Stop reason: HOSPADM

## 2024-08-05 RX ADMIN — ONDANSETRON 4 MG: 2 INJECTION INTRAMUSCULAR; INTRAVENOUS at 14:50

## 2024-08-05 RX ADMIN — MORPHINE SULFATE 4 MG: 4 INJECTION, SOLUTION INTRAMUSCULAR; INTRAVENOUS at 14:50

## 2024-08-05 ASSESSMENT — PAIN DESCRIPTION - LOCATION
LOCATION: PELVIS
LOCATION: HIP;SHOULDER

## 2024-08-05 ASSESSMENT — PAIN SCALES - GENERAL
PAINLEVEL_OUTOF10: 10
PAINLEVEL_OUTOF10: 7
PAINLEVEL_OUTOF10: 2
PAINLEVEL_OUTOF10: 1

## 2024-08-05 ASSESSMENT — LIFESTYLE VARIABLES
HOW MANY STANDARD DRINKS CONTAINING ALCOHOL DO YOU HAVE ON A TYPICAL DAY: PATIENT DOES NOT DRINK
HOW OFTEN DO YOU HAVE A DRINK CONTAINING ALCOHOL: NEVER

## 2024-08-05 ASSESSMENT — PAIN - FUNCTIONAL ASSESSMENT
PAIN_FUNCTIONAL_ASSESSMENT: 0-10
PAIN_FUNCTIONAL_ASSESSMENT: 0-10

## 2024-08-05 ASSESSMENT — PAIN DESCRIPTION - DESCRIPTORS: DESCRIPTORS: ACHING

## 2024-08-05 ASSESSMENT — PAIN DESCRIPTION - ORIENTATION: ORIENTATION: LEFT;RIGHT

## 2024-08-05 NOTE — ED NOTES
Per MD - Transfer Center states [facility] does not have a bed for pt. Pt's 2nd choice is [facility] 15 minutes from their primary home. Have talked with Transfer Center and made them aware. Pt has no pain and is resting on stretcher

## 2024-08-05 NOTE — ED TRIAGE NOTES
[facility] for multiple pelvic fractures and left clavicle fracture. Here for ortho.     [facility] reports patient's last meal being a sandwich and soda around 1800 tonight.    Alert and oriented with GCS 15. Room air. No signs of acute distress on arrival.

## 2024-08-05 NOTE — ED PROVIDER NOTES
as of 08/09/24 1607   Mon Aug 05, 2024   1354 Case discussed with Dr. Roberts orthopedic surgery and he will consult on patient pending patient is transferred to [facility] [SB]   1542 Follow-up consult requested of Dr. Roberts [SB]   1625 Patient accepted by Dr. Freeman for ED to ED transfer [SB]   Fri Aug 09, 2024   1558 CT PELVIS WO CONTRAST Additional Contrast? None [SB]      ED Course User Index  [SB] Chanda Garduno MD       Disposition Considerations (Tests not done, Shared Decision Making, Pt Expectation of Test or Treatment.): See ED Course    Patient was given the following medications:  Medications - No data to display    CONSULTS: (Who and What was discussed)  None     Social Determinants affecting Dx or Tx: None    Smoking Cessation: Not Applicable    PROCEDURES   Unless otherwise noted above, none.  Procedures      CRITICAL CARE TIME   CRITICAL CARE NOTE :    4:24 PM    IMPENDING DETERIORATION -orthopedic  ASSOCIATED RISK FACTORS - Hypotension, Trauma, and Vascular Compromise  MANAGEMENT- Bedside Assessment, Supervision of Care, and Transfer  INTERPRETATION -  CT Scan  INTERVENTIONS - hemodynamic mgmt  CASE REVIEW - Medical Sub-Specialist, Nursing, and Family  TREATMENT RESPONSE -Stable  PERFORMED BY - Self    NOTES:  I have spent 38 minutes of critical care time involved in lab review, consultations with specialist, family decision- making, bedside attention and documentation. Time is exclusive of EKG interpretation, imaging interpretation and separately billed procedures.  During this entire length of time I was immediately available to the patient.  Chanda Gardnuo MD  FINAL IMPRESSION   No diagnosis found.      DISPOSITION/PLAN   DISPOSITION      Transfer: The patient is being transferred to [facility]. The results of their tests and reasons for their transfer have been discussed with the patient and/or available family. The patient/family has conveyed  agreement and understanding for the need to be transferred and for their diagnosis. Consultation has been made with Dr. Freeman, who agrees to accept the transfer.      PATIENT REFERRED TO:  No follow-up provider specified.      DISCHARGE MEDICATIONS:     Medication List        ASK your doctor about these medications      amLODIPine 10 MG tablet  Commonly known as: NORVASC     apixaban 5 MG Tabs tablet  Commonly known as: ELIQUIS     aspirin 81 MG EC tablet     benzonatate 100 MG capsule  Commonly known as: TESSALON     cefdinir 300 MG capsule  Commonly known as: OMNICEF     cetirizine 10 MG tablet  Commonly known as: ZYRTEC     diclofenac sodium 1 % Gel  Commonly known as: VOLTAREN     fluticasone 50 MCG/ACT nasal spray  Commonly known as: FLONASE     lisinopril-hydroCHLOROthiazide 20-12.5 MG per tablet  Commonly known as: PRINZIDE;ZESTORETIC     valACYclovir 1 g tablet  Commonly known as: VALTREX                DISCONTINUED MEDICATIONS:  Current Discharge Medication List          I am the Primary Clinician of Record: Chanda Garduno MD (electronically signed)    (Please note that parts of this dictation were completed with voice recognition software. Quite often unanticipated grammatical, syntax, homophones, and other interpretive errors are inadvertently transcribed by the computer software. Please disregards these errors. Please excuse any errors that have escaped final proofreading.)     Chanda Garduno MD  08/09/24 5489       Chanda Garduno MD  08/09/24 5500

## 2024-08-05 NOTE — ED TRIAGE NOTES
Pt states he fell yesterday. C/o bilateral shoulder and hip pain  States no pain while still- any movement causes pain to go to 10.   Denies LOC  Was using a cane yesterday  States he has fallen a year ago and \"broke his neck\"

## 2024-08-05 NOTE — ED NOTES
Gave patient a sandwich and ginger ale. Elevated HOB to assist with eating. Patient is eating and drinking comfortably. Call light within reach

## 2024-08-06 ENCOUNTER — APPOINTMENT (OUTPATIENT)
Facility: HOSPITAL | Age: 89
End: 2024-08-06
Payer: MEDICARE

## 2024-08-06 PROBLEM — S32.599A FRACTURE OF PUBIC RAMUS (HCC): Status: ACTIVE | Noted: 2024-08-06

## 2024-08-06 PROBLEM — S42.022A DISPLACED FRACTURE OF SHAFT OF LEFT CLAVICLE, INITIAL ENCOUNTER FOR CLOSED FRACTURE: Status: ACTIVE | Noted: 2024-08-06

## 2024-08-06 LAB
25(OH)D3 SERPL-MCNC: 18.4 NG/ML (ref 30–100)
ANION GAP SERPL CALC-SCNC: 6 MMOL/L (ref 5–15)
BASOPHILS # BLD: 0 K/UL (ref 0–0.1)
BASOPHILS NFR BLD: 1 % (ref 0–1)
BUN SERPL-MCNC: 21 MG/DL (ref 6–20)
BUN/CREAT SERPL: 21 (ref 12–20)
CA-I BLD-MCNC: 8.5 MG/DL (ref 8.5–10.1)
CHLORIDE SERPL-SCNC: 106 MMOL/L (ref 97–108)
CO2 SERPL-SCNC: 27 MMOL/L (ref 21–32)
CREAT SERPL-MCNC: 1.01 MG/DL (ref 0.7–1.3)
DIFFERENTIAL METHOD BLD: ABNORMAL
EOSINOPHIL # BLD: 0.5 K/UL (ref 0–0.4)
EOSINOPHIL NFR BLD: 7 % (ref 0–7)
ERYTHROCYTE [DISTWIDTH] IN BLOOD BY AUTOMATED COUNT: 13.2 % (ref 11.5–14.5)
GLUCOSE SERPL-MCNC: 122 MG/DL (ref 65–100)
HCT VFR BLD AUTO: 36.9 % (ref 36.6–50.3)
HGB BLD-MCNC: 12.3 G/DL (ref 12.1–17)
IMM GRANULOCYTES # BLD AUTO: 0 K/UL (ref 0–0.04)
IMM GRANULOCYTES NFR BLD AUTO: 1 % (ref 0–0.5)
LYMPHOCYTES # BLD: 0.7 K/UL (ref 0.8–3.5)
LYMPHOCYTES NFR BLD: 8 % (ref 12–49)
MCH RBC QN AUTO: 30.4 PG (ref 26–34)
MCHC RBC AUTO-ENTMCNC: 33.3 G/DL (ref 30–36.5)
MCV RBC AUTO: 91.1 FL (ref 80–99)
MONOCYTES # BLD: 0.9 K/UL (ref 0–1)
MONOCYTES NFR BLD: 11 % (ref 5–13)
NEUTS SEG # BLD: 5.9 K/UL (ref 1.8–8)
NEUTS SEG NFR BLD: 72 % (ref 32–75)
NRBC # BLD: 0 K/UL (ref 0–0.01)
NRBC BLD-RTO: 0 PER 100 WBC
PLATELET # BLD AUTO: 155 K/UL (ref 150–400)
PMV BLD AUTO: 11.9 FL (ref 8.9–12.9)
POTASSIUM SERPL-SCNC: 3.6 MMOL/L (ref 3.5–5.1)
RBC # BLD AUTO: 4.05 M/UL (ref 4.1–5.7)
SODIUM SERPL-SCNC: 139 MMOL/L (ref 136–145)
WBC # BLD AUTO: 8.1 K/UL (ref 4.1–11.1)

## 2024-08-06 PROCEDURE — 80048 BASIC METABOLIC PNL TOTAL CA: CPT

## 2024-08-06 PROCEDURE — 97161 PT EVAL LOW COMPLEX 20 MIN: CPT

## 2024-08-06 PROCEDURE — 73000 X-RAY EXAM OF COLLAR BONE: CPT

## 2024-08-06 PROCEDURE — 97530 THERAPEUTIC ACTIVITIES: CPT

## 2024-08-06 PROCEDURE — 72190 X-RAY EXAM OF PELVIS: CPT

## 2024-08-06 PROCEDURE — 2580000003 HC RX 258: Performed by: INTERNAL MEDICINE

## 2024-08-06 PROCEDURE — 82306 VITAMIN D 25 HYDROXY: CPT

## 2024-08-06 PROCEDURE — 99222 1ST HOSP IP/OBS MODERATE 55: CPT

## 2024-08-06 PROCEDURE — 36415 COLL VENOUS BLD VENIPUNCTURE: CPT

## 2024-08-06 PROCEDURE — 6360000002 HC RX W HCPCS: Performed by: INTERNAL MEDICINE

## 2024-08-06 PROCEDURE — 85025 COMPLETE CBC W/AUTO DIFF WBC: CPT

## 2024-08-06 PROCEDURE — 6370000000 HC RX 637 (ALT 250 FOR IP): Performed by: INTERNAL MEDICINE

## 2024-08-06 PROCEDURE — 6370000000 HC RX 637 (ALT 250 FOR IP): Performed by: PHYSICIAN ASSISTANT

## 2024-08-06 PROCEDURE — 97165 OT EVAL LOW COMPLEX 30 MIN: CPT

## 2024-08-06 PROCEDURE — 1100000000 HC RM PRIVATE

## 2024-08-06 RX ORDER — LISINOPRIL 10 MG/1
20 TABLET ORAL DAILY
Status: DISCONTINUED | OUTPATIENT
Start: 2024-08-06 | End: 2024-08-09 | Stop reason: HOSPADM

## 2024-08-06 RX ORDER — ASPIRIN 81 MG
1 TABLET, DELAYED RELEASE (ENTERIC COATED) ORAL DAILY
Status: DISCONTINUED | OUTPATIENT
Start: 2024-08-06 | End: 2024-08-09 | Stop reason: HOSPADM

## 2024-08-06 RX ORDER — HYDROCHLOROTHIAZIDE 25 MG/1
12.5 TABLET ORAL DAILY
Status: DISCONTINUED | OUTPATIENT
Start: 2024-08-06 | End: 2024-08-09 | Stop reason: HOSPADM

## 2024-08-06 RX ADMIN — LISINOPRIL 20 MG: 20 TABLET ORAL at 12:34

## 2024-08-06 RX ADMIN — SODIUM CHLORIDE, PRESERVATIVE FREE 10 ML: 5 INJECTION INTRAVENOUS at 20:45

## 2024-08-06 RX ADMIN — HYDROCODONE BITARTRATE AND ACETAMINOPHEN 1 TABLET: 5; 325 TABLET ORAL at 23:06

## 2024-08-06 RX ADMIN — ASPIRIN 81 MG: 81 TABLET, COATED ORAL at 09:48

## 2024-08-06 RX ADMIN — HYDROCODONE BITARTRATE AND ACETAMINOPHEN 1 TABLET: 10; 325 TABLET ORAL at 09:48

## 2024-08-06 RX ADMIN — AMLODIPINE BESYLATE 10 MG: 5 TABLET ORAL at 09:48

## 2024-08-06 RX ADMIN — HYDROCHLOROTHIAZIDE 12.5 MG: 25 TABLET ORAL at 12:34

## 2024-08-06 RX ADMIN — Medication 1 TABLET: at 09:48

## 2024-08-06 RX ADMIN — ONDANSETRON 4 MG: 2 INJECTION INTRAMUSCULAR; INTRAVENOUS at 09:45

## 2024-08-06 RX ADMIN — ONDANSETRON 4 MG: 4 TABLET, ORALLY DISINTEGRATING ORAL at 23:10

## 2024-08-06 RX ADMIN — SODIUM CHLORIDE, PRESERVATIVE FREE 10 ML: 5 INJECTION INTRAVENOUS at 09:48

## 2024-08-06 RX ADMIN — SODIUM CHLORIDE, PRESERVATIVE FREE 10 ML: 5 INJECTION INTRAVENOUS at 01:48

## 2024-08-06 RX ADMIN — APIXABAN 5 MG: 5 TABLET, FILM COATED ORAL at 20:45

## 2024-08-06 ASSESSMENT — ENCOUNTER SYMPTOMS
ABDOMINAL PAIN: 0
BACK PAIN: 0
SHORTNESS OF BREATH: 0
COLOR CHANGE: 1
NAUSEA: 0
VOMITING: 0
COUGH: 0
SORE THROAT: 0

## 2024-08-06 ASSESSMENT — PAIN SCALES - GENERAL
PAINLEVEL_OUTOF10: 6
PAINLEVEL_OUTOF10: 0
PAINLEVEL_OUTOF10: 8

## 2024-08-06 ASSESSMENT — PAIN DESCRIPTION - DESCRIPTORS: DESCRIPTORS: ACHING

## 2024-08-06 ASSESSMENT — PAIN DESCRIPTION - LOCATION: LOCATION: HIP;SHOULDER

## 2024-08-06 ASSESSMENT — PAIN DESCRIPTION - ORIENTATION: ORIENTATION: RIGHT;LEFT

## 2024-08-06 NOTE — H&P
History & Physical    Primary Care Provider: Romeo Dietrich Sr., MD    Chief complaint:   Chief Complaint   Patient presents with    Care Transitions        History of Presenting Illness:   Tyesha David Jr. is a 89 y.o. male with PMH of hypertension Presented to the ED with chief complaint of hip pain after fall.  Patient had a fall on Sunday, leading to left shoulder pain. Patient reports legs gave way, otherwise denies vertigo, chest pain, shortness of breath or palpitation prior to fall.  No LOC. Pain persisted which prompted him to come to the ED in [place]. CT scan of the abdomen pelvis showed right superior and inferior pubic rami fracture and left sacral wing fracture.    In the ED, vital signs stable. Noted hypoxic when patient is sleeping, placed on 2L NC.  Lab work grossly within normal limits.  Chest x-ray showed extensive bilateral calcified pleural plaque. CT showed acute left clavicle fracture.  Also has severe diffuse osteopenia      Chart review: none          Past Medical History:   Diagnosis Date    Arthritis     HTN (hypertension)         History reviewed. No pertinent surgical history.    History reviewed. No pertinent family history.     Social History     Socioeconomic History    Marital status:      Spouse name: None    Number of children: None    Years of education: None    Highest education level: None   Tobacco Use    Smoking status: Never    Smokeless tobacco: Never   Substance and Sexual Activity    Drug use: Never        PTA medications and allergies per EMR.     Objective:     Physical Exam:     BP (!) 127/51   Pulse 61   Temp 98.3 °F (36.8 °C) (Oral)   Resp 12   Ht 1.93 m (6' 4\")   Wt 105.7 kg (233 lb)   SpO2 97%   BMI 28.36 kg/m²  O2 Flow Rate (L/min): 2 L/min O2 Device: Nasal cannula    General: Alert, cooperative, no distress  Head & neck: Normocephalic, without obvious abnormality, atraumatic. Neck supple, symmetrical, trachea midline.   Eyes:

## 2024-08-06 NOTE — CONSULTS
[facility] SURGERY  CONSULT          Chief Complaint: Right hip or pelvic pain    History of Present Illness:    Mr. Tyesha David Jr. is a 89 y.o. year old * male presents to emergency room after being transferred from [facility].  He fell down while he was trying to walk to the restroom on Sunday when he sustained a pain in his right hip area pain became worse that he could not even stand that prompted him to go to the emergency room at [place] where a CT scan of the abdomen pelvis showed right superior and inferior pubic rami fracture and left sacral wing fracture.  He was transferred over here for higher level of care.  Rest of the CT scan of the head neck chest were all within normal limits.  However CTA of the neck did show slight stenosis of the the origin of the right vertebral artery.  Patient denies any loss of consciousness, abdominal pain, nausea vomiting or seizures.  He is also on Eliquis.      Past Medical History:   Past Medical History:   Diagnosis Date    Arthritis     HTN (hypertension)        Past Surgical History: History reviewed. No pertinent surgical history.     Allergy:  Allergies   Allergen Reactions    Penicillins Anaphylaxis       Social History:  reports that he has never smoked. He has never used smokeless tobacco. He reports that he does not use drugs.     Family History:History reviewed. No pertinent family history.     Current Medications:No current facility-administered medications for this encounter.    Current Outpatient Medications:     amLODIPine (NORVASC) 10 MG tablet, amlodipine 10 mg tablet, Disp: , Rfl:     apixaban (ELIQUIS) 5 MG TABS tablet, Take 1 tablet by mouth 2 times daily, Disp: , Rfl:     aspirin 81 MG EC tablet, Take 1 tablet by mouth daily, Disp: , Rfl:     benzonatate (TESSALON) 100 MG capsule, benzonatate 100 mg capsule  TAKE ONE CAPSULE THREE TIMES DAILY IF NEEDED FOR COUGH FOR UP TO SEVEN DAYS. DO NOT CRUSH OR CHEW, Disp: , Rfl:      2.2     Magnesium    Collection Time: 08/05/24 10:54 AM   Result Value Ref Range    Magnesium 1.6 1.6 - 2.4 mg/dL   Urinalysis with Microscopic    Collection Time: 08/05/24 10:54 AM   Result Value Ref Range    Color, UA Yellow/Straw      Appearance Clear Clear      Specific Gravity, UA 1.020 1.003 - 1.030      pH, Urine 6.5 5.0 - 8.0      Protein, UA 30 (A) Negative mg/dL    Glucose, Ur Negative Negative mg/dL    Ketones, Urine Negative Negative mg/dL    Bilirubin, Urine Negative Negative      Blood, Urine Trace (A) Negative      Urobilinogen, Urine 1.0 0.2 - 1.0 EU/dL    Nitrite, Urine Negative Negative      Leukocyte Esterase, Urine Negative Negative      WBC, UA 0-4 0 - 5 /hpf    RBC, UA 0-5 0 - 3 /hpf    BACTERIA, URINE Negative Negative /hpf           Assessment:  Status post fall    Right superior and inferior pubic rami fractures.    Left sacral wing fracture.      Plan:    Admission to hospitalist  Diet: Regular diet  IV fluids  SCD  IS  Pain medications  Antibiotics  Nausea medication  Labs in the a.m.  Consult: Orthopedic   11.  PT OT consult   Thank you for the consultation & allowing me to participate in the care of this patient.

## 2024-08-06 NOTE — CONSULTS
08/05/24  1054   AST 24   ALT 16   BILITOT 1.5*   ALKPHOS 76       IMAGING:  EXAM:  CT LUMBAR SPINE WITHOUT  CONTRAST     INDICATION: Increased low back pain after fall. Reason for exam:->Increased low  back pain after fall     COMPARISON: None.     CONTRAST:  None.     TECHNIQUE: Multislice helical CT of the lumbar spine was performed without  intravenous contrast administration.  Coronal and sagittal reformats were  generated.  CT dose reduction was achieved through use of a standardized  protocol tailored for this examination and automatic exposure control for dose  modulation.     FINDINGS:     Bones are severely osteopenic. Incidental hemangiomas are noted in the L4 and L5  vertebral bodies. Vertebral body heights are normal. There is retrolisthesis at  L5-S1 measuring 9 mm. No paraspinal mass or swelling is shown. Several mild  bilateral renal cysts are shown measuring up to 4.6 cm. There are 2 nodules of  the right adrenal gland measuring 2.0 and 2.1 cm which are indeterminate.  Abundant atherosclerotic calcifications are noted. Prominent right and mild left  calcified pleural plaquing is included in the field-of-view.     Lower thoracic spine: Mild degenerative disc space narrowing at T10-11, T11-12  and T12-L1 with mild disc bulging and bilateral facet osteoarthrosis..     L1-L2: Mild disc space narrowing. Mild disc bulging and moderate bilateral facet  osteoarthrosis with mild-moderate canal and right greater than left bilateral  foraminal stenosis.     L2-L3: Normal disc height. Mild diffuse disc bulging and moderate bilateral  facet osteoarthrosis. Mild canal stenosis.     L3-L4: Normal disc height with mild diffuse disc bulging and severe bilateral  facet osteoarthrosis. Moderate to severe canal and left foraminal stenosis.  Mild/moderate right foraminal stenosis.     L4-L5: Mild disc Moderate disc space narrowing and diffuse disc bulging.  Moderate to severe right and mild left facet osteoarthrosis.  shoulder pain after fall     COMPARISON: None.     FINDINGS: Two views of the left shoulder demonstrate severe diffuse osteopenia  with mild AC joint and moderate glenohumeral joint osteoarthrosis. No  dislocation is shown. No definite acute fracture is demonstrated.     IMPRESSION: Severe diffuse osteopenia. No acute findings. If there remains  clinical concern for the possibility of radiographically occult fracture,  further evaluation with CT is recommended.    Dedicated clavicle and pelvis x-rays pending  Assessment/Plan:   Patient is a 89 year old male with acute right superior and inferior pubic rami and left sacral wing fractures following a ground level fall. No orthopedic surgical intervention necessary, we will proceed with non-operative management at this time. Recommend PT/OT. May mobilize OOB as tolerated,WBAT BLE with walker.  CT scan of the cervical spine revealed partially visualized left clavicular shaft fracture.  We will obtain dedicated clavicle x-rays. Clavicle fracture is non-operative recommend immobilization with sling to left shoulder as needed for comfort, can proceed with gentle ROM with PT/OT of the left upper extremity.     Weight bearing: May mobilize OOB as tolerated, WBAT BLE with walker. Will obtain pre and post-ambulatory pelvis films following PT/OT to ensure to change in fracture.   DVT Prophylaxis: per primary and SCDs per primary team  Anticipated discharge: TBD, PT/OT recs pending  Ortho f/u: 2 weeks with Dr. Raf Roberts aware and agrees with above plan.       Signed By: Daria Dwyer PA-C     August 6, 2024

## 2024-08-06 NOTE — ED NOTES
Dr. Ugalde at patient bedside.  
Incontinence care provided to patient. Linens changed. Street clothes removed. Gown placed. Patient connected to continuous cardiac, BP, and Sp02 monitoring.   
Patient found to be maintaining 02 saturations of 86-88% while sleeping. 2L nasal cannula applied to patient, then 02 sats shelbi to 97%.  
[unfilled]  Cultures: Cultures:  NIH Score: NIH   Active LDA's:   Peripheral IV 08/05/24 Distal;Right Cephalic (Active)     Active Central Lines:   Active Wounds: Patient's left clavicle has blue/ purple discoloration. Left hip seems to be externally rotated. States both areas are extremely painful when moving. Does not report pain when still in bed.    Active Pimentel's:   Active Feeding Tubes:   Administered Medications:   Medications   apixaban (ELIQUIS) tablet 5 mg (has no administration in time range)   aspirin EC tablet 81 mg (has no administration in time range)   amLODIPine (NORVASC) tablet 10 mg (has no administration in time range)   lisinopril-hydroCHLOROthiazide (PRINZIDE;ZESTORETIC) 20-12.5 MG per tablet 1 tablet (has no administration in time range)   sodium chloride flush 0.9 % injection 5-40 mL (has no administration in time range)   sodium chloride flush 0.9 % injection 5-40 mL (has no administration in time range)   0.9 % sodium chloride infusion (has no administration in time range)   potassium chloride (KLOR-CON M) extended release tablet 40 mEq (has no administration in time range)     Or   potassium bicarb-citric acid (EFFER-K) effervescent tablet 40 mEq (has no administration in time range)     Or   potassium chloride 10 mEq/100 mL IVPB (Peripheral Line) (has no administration in time range)   magnesium sulfate 2000 mg in 50 mL IVPB premix (has no administration in time range)   ondansetron (ZOFRAN-ODT) disintegrating tablet 4 mg (has no administration in time range)     Or   ondansetron (ZOFRAN) injection 4 mg (has no administration in time range)   polyethylene glycol (GLYCOLAX) packet 17 g (has no administration in time range)   acetaminophen (TYLENOL) tablet 650 mg (has no administration in time range)     Or   acetaminophen (TYLENOL) suppository 650 mg (has no administration in time range)   HYDROcodone-acetaminophen (NORCO) 5-325 MG per tablet 1 tablet (has no administration in time range)

## 2024-08-06 NOTE — PLAN OF CARE
OCCUPATIONAL THERAPY EVALUATION  Patient: Tyesha David Jr. (89 y.o. male)  Date: 8/6/2024  Primary Diagnosis: Fracture [T14.8XXA]       Precautions: Weight Bearing, Bed Alarm Right Lower Extremity Weight Bearing: Weight Bearing As Tolerated Left Lower Extremity Weight Bearing: Weight Bearing As Tolerated   Left Upper Extremity Weight Bearing: Non Weight Bearing        Recommendations for nursing mobility: Out of bed to chair for meals, Encourage HEP in prep for ADLs/mobility; see handout for details, Use of bed/chair alarm for safety, AD and gt belt for bed to chair , and Assist x1    In place during session:None  ASSESSMENT  Pt is a 89 y.o. male presenting to [facility] on [facility]/5/[facility]24 with c/o shoulder pain s/p fall. Imaging revealed bilateral pelvic fracture and CT showed possible clavicular shaft fracture. Pt is WBAT to BLE and NWB to L shoulder with sling in place. Pt admitted and currently being treated for pubic rami frcture, clavicular fx, osteopenia, PE, and HTN.. Pt received semi-supine in bed upon arrival, AXO x4, and agreeable to OT evaluation.     Based on current observations, pt presents with decreased  functional mobility, strength, body mechanics, safety awareness (see below for objective details and assist levels).     Overall, pt tolerates session fair with c/o pain at the l shoulder during movement. Pt required MOD Ax2 for bed mobility with cueing required for mainteneance of NWB to LUE. Once seated EOB pt tolerated EOB sitting with good static balance noted. Pt required max x2 for sit to stand with cueing required for improved body mechanis to initiate sit to stand. With use of shanita walker pt was able to stand with good/fair balance noted. Pt required modx2 for bed to chair transfers with VC for miantianing NWB to LUE. Cueing provided for safety in bed side chair, pt verbalized understanding.. Pt will benefit from continued skilled OT services to address current impairments and improve IND and safety  position    COMMUNICATION/EDUCATION:   The patient’s plan of care was discussed with: Physical therapist and Registered nurse    Patient Education  Education Given To: Patient  Education Provided: Role of Therapy;Plan of Care;Home Exercise Program;Precautions;Energy Conservation  Education Method: Demonstration;Verbal  Barriers to Learning: None  Education Outcome: Verbalized understanding;Continued education needed    The supervising occupational therapist and treating occupational therapist assistant have met to review this patient’s progress and plan of care.    This patient’s plan of care is appropriate for delegation to MONY.     Thank you for this referral,  Geeta Carey OT  Minutes: 33

## 2024-08-06 NOTE — PROGRESS NOTES
Hospitalist Progress Note    NAME:   Tyesha David Jr.   : 10/1/1933   MRN: [id number]     Date/Time: 2024 1:11 PM  Patient PCP: Romeo Dietrich Sr., MD    Estimated discharge date: 24 to 48 hours  Barriers: PT/OT evaluation, pre and post ambulatory pelvis films      Assessment / Plan:  Pubic rami fracture  - PT/OT pending  - Continue pain control  - Trauma surgery following  - Orthopedic surgery recommending pre and post ambulatory pelvis films following PT/OT to ensure no change in fracture  - Mobilize out of bed as tolerated, WBAT BLE with walker    Acute left clavicular shaft fracture  - Orthopedic surgery recommending nonoperative management, immobilization with sling to left shoulder as needed for comfort, proceed with gentle ROM with PT/OT    Osteopenia  - Calcium 8.8, vitamin D pending  - Calcium carb-cholecalciferol supplementation    History of PE  - Restart Eliquis 5 mg twice daily    Essential hypertension  - Continue home lisinopril 20 mg, HCTZ 12.5 mg    Adrenal gland nodule  - Outpatient workup    Code Status: FULL  DVT Prophylaxis: eliquis  GI Prophylaxis:not indicated    --------------------------------------------------------------------  [] High (any 2)    A. Problems (any 1)  [] Acute/Chronic Illness/injury posing threat to life or bodily function:    [] Severe exacerbation of chronic illness:    ---------------------------------------------------------------------  B. Risk of Treatment (any 1)   [] Drugs/treatments that require intensive monitoring for toxicity include:    [] IV ABX requiring serial renal monitoring for nephrotoxicity:     [] IV Narcotic analgesia for adverse drug reaction  [] Aggressive IV diuresis requiring serial monitoring for renal impairment and electrolyte derangements  [] Critical electrolyte abnormalities requiring IV replacement and close serial monitoring  [] Insulin - monitoring serial FSBS for Hypoglycemic adverse drug reaction  [] Other -   [] Change in    ________________________________________________________________________  David Donald PA-C     Procedures: see electronic medical records for all procedures/Xrays and details which were not copied into this note but were reviewed prior to creation of Plan.      LABS:  I reviewed today's most current labs and imaging studies.  Pertinent labs include:  Recent Labs     08/05/24  1054 08/06/24  0648   WBC 9.1 8.1   HGB 13.2 12.3   HCT 38.4 36.9    155     Recent Labs     08/05/24  1054 08/06/24  0648    139   K 3.8 3.6    106   CO2 29 27   BUN 19 21*   MG 1.6  --    ALT 16  --        Signed: David Donald PA-C

## 2024-08-06 NOTE — PROGRESS NOTES
4 Eyes Skin Assessment     NAME:  Tyesha David Jr.  YOB: 1933  MEDICAL RECORD NUMBER:  [id number]    The patient is being assessed for  Admission    I agree that at least one RN has performed a thorough Head to Toe Skin Assessment on the patient. ALL assessment sites listed below have been assessed.      Areas assessed by both nurses:    Head, Face, Ears, Shoulders, Back, Chest, Arms, Elbows, Hands, Sacrum. Buttock, Coccyx, Ischium, and Legs. Feet and Heels        Does the Patient have a Wound? Yes wound(s) were present on assessment. LDA wound assessment was Initiated and completed by RN       Miah Prevention initiated by RN: Yes  Wound Care Orders initiated by RN: No    Pressure Injury (Stage 3,4, Unstageable, DTI, NWPT, and Complex wounds) if present, place Wound referral order by RN under : No    New Ostomies, if present place, Ostomy referral order under : No     Nurse 1 eSignature: Electronically signed by Ofelia Justin RN on 8/6/24 at 2:36 AM EDT    **SHARE this note so that the co-signing nurse can place an eSignature**    Nurse 2 eSignature: Electronically signed by Marie Dent RN on 8/6/24 at 2:43 AM EDT

## 2024-08-06 NOTE — CARE COORDINATION
08/06/24 1115   Service Assessment   Patient Orientation Alert and Oriented   Cognition Alert   History Provided By Patient   Primary Caregiver Spouse   Accompanied By/Relationship Alone in room   Support Systems Spouse/Significant Other   Patient's Healthcare Decision Maker is: Legal Next of Kin   PCP Verified by CM Yes   Last Visit to PCP Within last year   Prior Functional Level Independent in ADLs/IADLs   Current Functional Level Assistance with the following:;Bathing;Dressing;Mobility;Toileting;Feeding;Cooking;Shopping   Can patient return to prior living arrangement Unknown at present   Ability to make needs known: Good   Family able to assist with home care needs: Yes   Financial Resources Medicare   Social/Functional History   Lives With Spouse   Type of Home House   Home Layout Two level   Home Access Stairs to enter with rails   Entrance Stairs - Number of Steps 3-4   Home Equipment Cane;Walker - Rolling   ADL Assistance Independent   Homemaking Assistance Independent   Ambulation Assistance Independent   Transfer Assistance Independent   Active  Yes   Occupation Retired   Services At/After Discharge   Transition of Care Consult (CM Consult) Discharge Planning     CM met f/f with patient to complete dcp. Demos on face sheet confirmed as accurate. Patient lives in 2 story home with spouse. Independent in ADL prior to injury and reports \"I cut my own grass\".  Uses walker or cane PRN. Past SNF at [place], [place] in late 2023 followed by HH (unable to recall agency)    DCP: pending PT/OT recs    Advance Care Planning     General Advance Care Planning (ACP) Conversation    Date of Conversation: 8/6/2024  Conducted with: Patient with Decision Making Capacity  Other persons present: None    Healthcare Decision Maker: No healthcare decision makers have been documented.       Content/Action Overview:  Has NO ACP documents-Information provided  Reviewed DNR/DNI and patient elects Full Code  (Attempt Resuscitation)      Geeta Son

## 2024-08-06 NOTE — PLAN OF CARE
PHYSICAL THERAPY EVALUATION  Patient: Tyesha David Jr. (89 y.o. male)  Date: 8/6/2024  Primary Diagnosis: Fracture [T14.8XXA]       Precautions: Weight Bearing, Fall Risk, Bed Alarm Right Lower Extremity Weight Bearing: Weight Bearing As Tolerated   Left Lower Extremity Weight Bearing: Weight Bearing As Tolerated     Left Upper Extremity Weight Bearing: Non Weight Bearing          Recommendations for nursing mobility: Out of bed to chair for meals, Encourage HEP in prep for ADLs/mobility; see handout for details, Frequent repositioning to prevent skin breakdown, Use of bed/chair alarm for safety, Use of BSC for toileting , AD and gt belt for bed to chair , Tatiana Stedy for bed to chair transfers , Assist x2, nonWB on LUE with sling in place, and WBAT BLE    In place during session: External Catheter and EKG/telemetry     ASSESSMENT  Pt is a 89 y.o. male admitted on 8/5/2024 for hip pain 2/2 GLF; PMHx HTN, HLD, OA, RLE DVT/segmental PE 2 years ago, hx of asbestosis, hx of c-spine fracture; pt currently being treated for right superior and inferior pubic rami fracture and left sacral wing fracture with non-operative mgmt, L clavicular shaft fracture. Pt semi supine upon PT arrival, agreeable to evaluation. Pt A&O x 4.      Based on the objective data described below, the patient currently presents with impaired functional mobility, decreased independence in ADLs, decreased ROM, impaired strength, decreased activity tolerance, impaired balance, and increased pain levels. (See below for objective details and assist levels).     Overall pt tolerated session fair today with c/o B shoulder pain with movement L >R. Pt required modAx2 for bed mobility for BLE support, trunk support, and frequent cues to maintain LUE NWB. Pt requires maxAx2 for all transfers for anterior weight shift, stability, VC for hand placement. Pt amb 2 feet with R hemiwalker, gt belt and modAx2; demonstrates narrow STELLA, antalgic gait bilaterally with  transfers, OOB transfers, amb with AD, and standing static and dynamic balance    Functional Measure:  [facility] AM-PAC™ “6 Clicks”         Basic Mobility Inpatient Short Form  How much difficulty does the patient currently have... Unable A Lot A Little None   1.  Turning over in bed (including adjusting bedclothes, sheets and blankets)?   [] 1   [] 2   [x] 3   [] 4   2.  Sitting down on and standing up from a chair with arms ( e.g., wheelchair, bedside commode, etc.)   [] 1   [x] 2   [] 3   [] 4   3.  Moving from lying on back to sitting on the side of the bed?   [] 1   [x] 2   [] 3   [] 4          How much help from another person does the patient currently need... Total A Lot A Little None   4.  Moving to and from a bed to a chair (including a wheelchair)?   [] 1   [x] 2   [] 3   [] 4   5.  Need to walk in hospital room?   [] 1   [x] 2   [] 3   [] 4   6.  Climbing 3-5 steps with a railing?   [] 1   [x] 2   [] 3   [] 4   © 2007, Trustees of [facility], under license to [employer]. All rights reserved     Score:  Initial: 13/24 Most Recent: X (Date: 8/6/2024 )   Interpretation of Tool:  Represents activities that are increasingly more difficult (i.e. Bed mobility, Transfers, Gait).  Score 24 23 22-20 19-15 14-10 9-7 6   Modifier CH CI CJ CK CL CM CN         Physical Therapy Evaluation Charge Determination   History Examination Presentation Decision-Making   HIGH Complexity :3+ comorbidities / personal factors will impact the outcome/ POC  MEDIUM Complexity : 3 Standardized tests and measures addressing body structure, function, activity limitation and / or participation in recreation  LOW Complexity : Stable, uncomplicated  Other outcome measures [facility] 6  MEDIUM      Based on the above components, the patient evaluation is determined to be of the following complexity level: LOW    Pain Rating:  C/o B shoulder pain, R shoulder pain with PROM, did not rate   Pain Intervention(s):   rest and

## 2024-08-07 PROBLEM — E27.9 ADRENAL NODULE: Status: ACTIVE | Noted: 2024-08-07

## 2024-08-07 PROBLEM — E27.8 ADRENAL NODULE (HCC): Status: ACTIVE | Noted: 2024-08-07

## 2024-08-07 PROBLEM — E55.9 VITAMIN D DEFICIENCY: Status: ACTIVE | Noted: 2024-08-07

## 2024-08-07 PROBLEM — I10 HTN (HYPERTENSION): Status: ACTIVE | Noted: 2024-08-07

## 2024-08-07 LAB
ANION GAP SERPL CALC-SCNC: 6 MMOL/L (ref 5–15)
BASOPHILS # BLD: 0.1 K/UL (ref 0–0.1)
BASOPHILS NFR BLD: 1 % (ref 0–1)
BUN SERPL-MCNC: 26 MG/DL (ref 6–20)
BUN/CREAT SERPL: 23 (ref 12–20)
CA-I BLD-MCNC: 8.4 MG/DL (ref 8.5–10.1)
CHLORIDE SERPL-SCNC: 105 MMOL/L (ref 97–108)
CO2 SERPL-SCNC: 23 MMOL/L (ref 21–32)
CREAT SERPL-MCNC: 1.12 MG/DL (ref 0.7–1.3)
DIFFERENTIAL METHOD BLD: ABNORMAL
EOSINOPHIL # BLD: 0.6 K/UL (ref 0–0.4)
EOSINOPHIL NFR BLD: 7 % (ref 0–7)
ERYTHROCYTE [DISTWIDTH] IN BLOOD BY AUTOMATED COUNT: 13.1 % (ref 11.5–14.5)
GLUCOSE SERPL-MCNC: 128 MG/DL (ref 65–100)
HCT VFR BLD AUTO: 35.6 % (ref 36.6–50.3)
HGB BLD-MCNC: 11.8 G/DL (ref 12.1–17)
IMM GRANULOCYTES # BLD AUTO: 0 K/UL (ref 0–0.04)
IMM GRANULOCYTES NFR BLD AUTO: 0 % (ref 0–0.5)
LYMPHOCYTES # BLD: 0.9 K/UL (ref 0.8–3.5)
LYMPHOCYTES NFR BLD: 11 % (ref 12–49)
MCH RBC QN AUTO: 30.7 PG (ref 26–34)
MCHC RBC AUTO-ENTMCNC: 33.1 G/DL (ref 30–36.5)
MCV RBC AUTO: 92.7 FL (ref 80–99)
MONOCYTES # BLD: 1 K/UL (ref 0–1)
MONOCYTES NFR BLD: 12 % (ref 5–13)
NEUTS SEG # BLD: 5.6 K/UL (ref 1.8–8)
NEUTS SEG NFR BLD: 69 % (ref 32–75)
NRBC # BLD: 0 K/UL (ref 0–0.01)
NRBC BLD-RTO: 0 PER 100 WBC
PLATELET # BLD AUTO: 149 K/UL (ref 150–400)
PMV BLD AUTO: 11.5 FL (ref 8.9–12.9)
POTASSIUM SERPL-SCNC: 3.9 MMOL/L (ref 3.5–5.1)
RBC # BLD AUTO: 3.84 M/UL (ref 4.1–5.7)
SODIUM SERPL-SCNC: 134 MMOL/L (ref 136–145)
WBC # BLD AUTO: 8.1 K/UL (ref 4.1–11.1)

## 2024-08-07 PROCEDURE — 85025 COMPLETE CBC W/AUTO DIFF WBC: CPT

## 2024-08-07 PROCEDURE — 6370000000 HC RX 637 (ALT 250 FOR IP): Performed by: INTERNAL MEDICINE

## 2024-08-07 PROCEDURE — 2580000003 HC RX 258: Performed by: INTERNAL MEDICINE

## 2024-08-07 PROCEDURE — 97530 THERAPEUTIC ACTIVITIES: CPT

## 2024-08-07 PROCEDURE — 36415 COLL VENOUS BLD VENIPUNCTURE: CPT

## 2024-08-07 PROCEDURE — 2500000003 HC RX 250 WO HCPCS: Performed by: PHYSICIAN ASSISTANT

## 2024-08-07 PROCEDURE — 1100000000 HC RM PRIVATE

## 2024-08-07 PROCEDURE — 80048 BASIC METABOLIC PNL TOTAL CA: CPT

## 2024-08-07 PROCEDURE — 6370000000 HC RX 637 (ALT 250 FOR IP): Performed by: PHYSICIAN ASSISTANT

## 2024-08-07 RX ORDER — ASPIRIN 81 MG
1 TABLET, DELAYED RELEASE (ENTERIC COATED) ORAL DAILY
Qty: 30 TABLET | Refills: 0 | Status: SHIPPED | OUTPATIENT
Start: 2024-08-08 | End: 2024-09-07

## 2024-08-07 RX ORDER — HYDROCODONE BITARTRATE AND ACETAMINOPHEN 5; 325 MG/1; MG/1
1 TABLET ORAL EVERY 12 HOURS PRN
Qty: 8 TABLET | Refills: 0 | Status: SHIPPED | OUTPATIENT
Start: 2024-08-07 | End: 2024-08-14

## 2024-08-07 RX ORDER — VITAMIN B COMPLEX
1000 TABLET ORAL DAILY
Status: DISCONTINUED | OUTPATIENT
Start: 2024-08-07 | End: 2024-08-09 | Stop reason: HOSPADM

## 2024-08-07 RX ORDER — CHOLECALCIFEROL (VITAMIN D3) 25 MCG
1000 TABLET ORAL DAILY
Qty: 30 TABLET | Refills: 0 | Status: SHIPPED | OUTPATIENT
Start: 2024-08-08 | End: 2024-09-07

## 2024-08-07 RX ORDER — HYDROMORPHONE HYDROCHLORIDE 1 MG/ML
0.5 INJECTION, SOLUTION INTRAMUSCULAR; INTRAVENOUS; SUBCUTANEOUS EVERY 4 HOURS PRN
Status: DISCONTINUED | OUTPATIENT
Start: 2024-08-07 | End: 2024-08-09 | Stop reason: HOSPADM

## 2024-08-07 RX ADMIN — ONDANSETRON 4 MG: 4 TABLET, ORALLY DISINTEGRATING ORAL at 09:55

## 2024-08-07 RX ADMIN — ONDANSETRON 4 MG: 4 TABLET, ORALLY DISINTEGRATING ORAL at 21:11

## 2024-08-07 RX ADMIN — ASPIRIN 81 MG: 81 TABLET, COATED ORAL at 09:51

## 2024-08-07 RX ADMIN — APIXABAN 5 MG: 5 TABLET, FILM COATED ORAL at 09:50

## 2024-08-07 RX ADMIN — AMLODIPINE BESYLATE 10 MG: 5 TABLET ORAL at 09:50

## 2024-08-07 RX ADMIN — Medication 1 TABLET: at 09:51

## 2024-08-07 RX ADMIN — SODIUM CHLORIDE, PRESERVATIVE FREE 10 ML: 5 INJECTION INTRAVENOUS at 09:52

## 2024-08-07 RX ADMIN — Medication 1000 UNITS: at 09:51

## 2024-08-07 RX ADMIN — SODIUM CHLORIDE, PRESERVATIVE FREE 10 ML: 5 INJECTION INTRAVENOUS at 21:07

## 2024-08-07 RX ADMIN — HYDROCODONE BITARTRATE AND ACETAMINOPHEN 1 TABLET: 10; 325 TABLET ORAL at 09:51

## 2024-08-07 RX ADMIN — HYDROCHLOROTHIAZIDE 12.5 MG: 25 TABLET ORAL at 09:50

## 2024-08-07 RX ADMIN — LISINOPRIL 20 MG: 20 TABLET ORAL at 09:51

## 2024-08-07 RX ADMIN — HYDROMORPHONE HYDROCHLORIDE 0.5 MG: 1 INJECTION, SOLUTION INTRAMUSCULAR; INTRAVENOUS; SUBCUTANEOUS at 21:11

## 2024-08-07 ASSESSMENT — ENCOUNTER SYMPTOMS
SORE THROAT: 0
SHORTNESS OF BREATH: 0
COLOR CHANGE: 1

## 2024-08-07 ASSESSMENT — PAIN DESCRIPTION - ORIENTATION
ORIENTATION: RIGHT;LEFT
ORIENTATION: LEFT

## 2024-08-07 ASSESSMENT — PAIN SCALES - GENERAL
PAINLEVEL_OUTOF10: 9
PAINLEVEL_OUTOF10: 5
PAINLEVEL_OUTOF10: 8
PAINLEVEL_OUTOF10: 0

## 2024-08-07 ASSESSMENT — PAIN SCALES - WONG BAKER
WONGBAKER_NUMERICALRESPONSE: NO HURT

## 2024-08-07 ASSESSMENT — PAIN DESCRIPTION - LOCATION
LOCATION: ABDOMEN
LOCATION: ARM;SHOULDER;HIP
LOCATION: HIP;SHOULDER

## 2024-08-07 ASSESSMENT — PAIN DESCRIPTION - DESCRIPTORS
DESCRIPTORS: THROBBING
DESCRIPTORS: ACHING

## 2024-08-07 NOTE — PLAN OF CARE
PHYSICAL THERAPY TREATMENT     Patient: Tyesha David Jr. (89 y.o. male)  Date: 8/7/2024  Diagnosis: Fracture [T14.8XXA] Fracture      Precautions: Weight Bearing, Fall Risk, Bed Alarm Right Lower Extremity Weight Bearing: Weight Bearing As Tolerated   Left Lower Extremity Weight Bearing: Weight Bearing As Tolerated     Left Upper Extremity Weight Bearing: Non Weight Bearing          Recommendations for nursing mobility: Out of bed to chair for meals, Encourage HEP in prep for ADLs/mobility; see handout for details, Frequent repositioning to prevent skin breakdown, AD and gt belt for bed to chair , and Assist x2    In place during session: External Catheter  Chart, physical therapy assessment, plan of care and goals were reviewed.  ASSESSMENT  Patient continues with skilled PT services and is progressing towards goals. Pt sitting up in the bed upon PT arrival, agreeable to session. Pt A&O x 4. Noted in most previous ortho note that pt is allowed to WBAT to the LUE . Notified supervising PT so we can try using RW. (See below for objective details and assist levels).     Overall pt tolerated session fair today with improved mobility. Demos difficulty with bed mobility due to pain in BUE and BLE limiting ability to assist/pull up to EOB. Patient improved with OOB mobility and was given RW to use and pt did well. Demos very slow mobility with slow gt speed noted but overall min-mod A x2. Patient demos difficulty with advancing feet due to pain with WB and lifting the hips. Patient overall improving slowly, limited by pain at this time but remains motivated. Educated on LE therex and pt verbalized understanding. Will continue to benefit from skilled PT services, and will continue to progress as tolerated. Potential barriers for safe discharge: pt is a high fall risk, pt is not safe to be alone, concern for pt safely navigating or managing the home environment, and pt has new weight bearing restrictions limiting

## 2024-08-07 NOTE — PROGRESS NOTES
plan of care was discussed with: Physical therapist, Physical therapy assistant, Occupational therapist, and Registered nurse  PT/OT sessions occurred together for increased safety of pt and clinician.    Patient Education  Education Given To: Patient  Education Provided: Role of Therapy;Plan of Care;Home Exercise Program;Precautions;Energy Conservation  Education Method: Demonstration;Verbal  Barriers to Learning: None  Education Outcome: Verbalized understanding;Continued education needed    Thank you for this referral.  MONY Ramos  Minutes: 43

## 2024-08-07 NOTE — CARE COORDINATION
CM reviewed chart.    Therapy recommended SNF.    Pts spouse is on the way. CM will meet with patient and spouse for preference of facility.     1150: Cm met with spouse and patient at bedside. Spouse provided name of facility she prefers for rehab.     [address]  [phone]    Cm spoke to Ofelia Murphy regarding referral  and explained recs are for SNF but pts spouse is requesting [facility]. Ofelia will review. Of note, no bed available today. Referral sent via [facility].

## 2024-08-07 NOTE — PLAN OF CARE
PHYSICAL THERAPY TREATMENT     Patient: Tyesha David Jr. (89 y.o. male)  Date: 8/7/2024  Diagnosis: Fracture [T14.8XXA] Fracture      Precautions: Weight Bearing, Fall Risk, Bed Alarm Right Lower Extremity Weight Bearing: Weight Bearing As Tolerated   Left Lower Extremity Weight Bearing: Weight Bearing As Tolerated     Left Upper Extremity Weight Bearing: Non Weight Bearing          Recommendations for nursing mobility: Out of bed to chair for meals, Encourage HEP in prep for ADLs/mobility; see handout for details, and Frequent repositioning to prevent skin breakdown    In place during session: External Catheter  Chart, physical therapy assessment, plan of care and goals were reviewed.  ASSESSMENT  Patient continues with skilled PT services and is slowly progressing towards goals. Pt in recliner upon PT arrival, agreeable to session. Pt A&O x 4, seen for BID to assist with nursing back to bed. (See below for objective details and assist levels).     Overall pt tolerated session well today but req more assist to stand from lower recliner. Patient stood and amb approx 3 ft with RW and no LOB but still demos difficulty advancing BLEs  Updating current discharge recommendations from SNF to IRF when medically appropriate due to improved mobility and pt was IND prior to arrival; change discussed and agreed upon with supervising PT Aby Boudreaux. Co-signature obtained.  Will continue to benefit from skilled PT services, and will continue to progress as tolerated. Potential barriers for safe discharge: pts current support system is unable to meet their requirements for physical assistance, pt is not safe to be alone, and concern for pt safely navigating or managing the home environment. Current PT DC recommendation Inpatient Rehabilitation Facility  once medically appropriate.      GOALS:    Problem: Physical Therapy - Adult  Goal: By Discharge: Performs mobility at highest level of function for planned discharge  setting.  See evaluation for individualized goals.  Description: FUNCTIONAL STATUS PRIOR TO ADMISSION: Patient was modified independent using a single point cane for functional mobility.    HOME SUPPORT PRIOR TO ADMISSION: The patient lived with spouse but did not require assistance.    Physical Therapy Goals  Initiated 8/6/2024  Pt stated goal: to go home  Pt will be I with LE HEP in 7 days.  Pt will perform bed mobility with Modified Curry in 7 days.  Pt will perform transfers with Modified Curry in 7 days.   Pt will amb 25-50 feet with LRAD safely with Contact Guard Assist in 7 days.  Pt will ascend/descend 5 steps with L handrail(s) ascending and descending and Minimal Assist in 7 days to safely enter/navigate home.   Pt will verbalize and demonstrate compliance with wbing precautions to include WBAT BLE, NWB LUE in 7 days.   Pt will demonstrate improvement in standing balance from poor to good/fair in 7 days.     8/7/2024 1613 by Suzette Ellis, LIZET  Outcome: Progressing          PLAN :  Patient continues to benefit from skilled intervention to address functional impairments. Continue treatment per established plan of care to address goals.    Recommendation for discharge: (in order for the patient to meet his/her long term goals)  Inpatient Rehabilitation Facility     IF patient discharges home will need the following DME:rolling walker and continuing to assess with progress     SUBJECTIVE:   Patient stated “I might need some pain medicine.”    OBJECTIVE DATA SUMMARY:   Critical Behavior:  Orientation  Overall Orientation Status: Within Normal Limits  Orientation Level: Oriented X4  Cognition  Overall Cognitive Status: WFL    Functional Mobility Training:  Bed Mobility:  Bed Mobility Training  Interventions: Verbal cues;Safety awareness training;Weight shifting training/pressure relief;Visual cues;Manual cues  Rolling: Moderate assistance;Assist X2  Supine to Sit: Assist X2;Moderate

## 2024-08-07 NOTE — DISCHARGE SUMMARY
Discharge Summary    Name: Tyesha David Jr.  [id number]  YOB: 1933 (Age: 89 y.o.)   Date of Admission: 8/5/2024  Date of Discharge: 8/7/2024  Attending Physician: West Estrella MD    Discharge Diagnosis:   Principal Problem:    Fracture  Active Problems:    Displaced fracture of shaft of left clavicle, initial encounter for closed fracture    Fracture of pubic ramus (HCC)    Vitamin D deficiency    Adrenal nodule (HCC)    HTN (hypertension)  Resolved Problems:    * No resolved hospital problems. *       Consultations:  IP CONSULT TO TRAUMA SURGERY  IP CONSULT TO HOSPITALIST  IP CONSULT TO ORTHOPEDIC SURGERY    Brief Hospital Course by Main Problems:   Tyesha David Jr. is a 89 y.o. male with PMH of hypertension Presented to the ED with chief complaint of hip pain after fall.  Patient had a fall on Sunday, leading to left shoulder pain. Pain persisted which prompted him to come to the ED in [place]. CT scan of the abdomen pelvis showed right superior and inferior pubic rami fracture and left sacral wing fracture. In the ED, vital signs stable. Noted hypoxic when patient is sleeping, placed on 2L NC.  Lab work grossly within normal limits.  Chest x-ray showed extensive bilateral calcified pleural plaque. CT showed acute left clavicle fracture.  Also has severe diffuse osteopenia.  Patient was admitted to the hospital for further evaluation and management.  Orthopedic surgery recommended nonoperative management for both pubic rami and left sacral wing fractures as well as left clavicular shaft fracture.  Repeat x-ray left clavicle revealed obliquely oriented relatively nondisplaced distal clavicle nuclear fracture.  XR pelvis revealed fractures of right superior and inferior pubic rami with limited visualization.  Patient continued to improve symptomatically.  PT/OT recommended SNF.  At this time patient is felt to be medically stable for discharge, understands  130*   < > 128*   CALCIUM 8.8   < > 8.4*   MG 1.6  --   --     < > = values in this interval not displayed.     Recent Labs     08/07/24  0746   HGB 11.8*   HCT 35.6*   WBC 8.1   *       Discharge Medications:     Medication List        START taking these medications      calcium carb-cholecalciferol 600-5 MG-MCG Tabs tablet  Take 1 tablet by mouth daily  Start taking on: August 8, 2024     HYDROcodone-acetaminophen 5-325 MG per tablet  Commonly known as: NORCO  Take 1 tablet by mouth every 12 hours as needed for Pain for up to 8 doses. Max Daily Amount: 2 tablets     Vitamin D3 25 MCG Tabs  Take 1 tablet by mouth daily  Start taking on: August 8, 2024            CONTINUE taking these medications      amLODIPine 10 MG tablet  Commonly known as: NORVASC     apixaban 5 MG Tabs tablet  Commonly known as: ELIQUIS     aspirin 81 MG EC tablet     cefdinir 300 MG capsule  Commonly known as: OMNICEF     lisinopril-hydroCHLOROthiazide 20-12.5 MG per tablet  Commonly known as: PRINZIDE;ZESTORETIC     valACYclovir 1 g tablet  Commonly known as: VALTREX            STOP taking these medications      benzonatate 100 MG capsule  Commonly known as: TESSALON     cetirizine 10 MG tablet  Commonly known as: ZYRTEC     diclofenac sodium 1 % Gel  Commonly known as: VOLTAREN     fluticasone 50 MCG/ACT nasal spray  Commonly known as: FLONASE               Where to Get Your Medications        These medications were sent to [facility] [place], [place] - [address] [place] [address] - P [phone] - F [phone]  [address] [place] [address]      Phone: [phone]   calcium carb-cholecalciferol 600-5 MG-MCG Tabs tablet  Vitamin D3 25 MCG Tabs       Information about where to get these medications is not yet available    Ask your nurse or doctor about these medications  HYDROcodone-acetaminophen 5-325 MG per tablet             DISPOSITION:    Home with Family:       Home with HH/PT/OT/RN:    SNF/LTC: x   RASTA:

## 2024-08-07 NOTE — PROGRESS NOTES
ORTHOPEDIC CONSULT    Patient: Tyesha David Jr. MRN: [id number]  SSN: [id number]    YOB: 1933  Age: 89 y.o.  Sex: male      Subjective:      Tyesha David Jr. is a 89 y.o. male with hypertension, HLD, OA, RLE DVT/segmental PE 2 years ago followed by [facility], history of asbestosis, history of c-spine fracture  who is being seen for right superior and inferior pubic rami fracture and left sacral wing fracture.  Patient reports he fell on 08/11/2024 after his legs gave away, he reports since that time he has been having pain with weightbearing he also reports left shoulder pain.  Patient states it took him hours after the fall to get up.  He was transported to the emergency department in [place], [place].  A CT scan of the pelvis was obtained which revealed a right superior and inferior pubic rami fracture and left sacral wing fracture.  Orthopedics was consulted and the decision was made to transfer the patient to a facility of higher level care for further evaluation of these fractures.  Patient was transported to [facility] emergency department.  While in the ED patient continued to complain of left shoulder pain.  An x-ray of the left shoulder was obtained which was negative. CT scan of the cervical spine was obtained which showed a possible partially viewed left clavicle fracture.  Patient states the pain is progressively getting better since the fall, however he would like to get out of bed and start walking since he has not done that yet.  Patient denies pain elsewhere other than the pelvis and the left shoulder.  Patient denies nausea, vomiting, fever, chills, shortness of breath, chest pain.    08/07/2024: Patient sitting in chair with wife at bedside.  Patient continues to have pain with weightbearing.  He reports that he was able to get up with physical therapy today, however he did not walk much.  He states he only shuffled from the bed to the chair to sit down.

## 2024-08-07 NOTE — PROGRESS NOTES
Physician Progress Note      PATIENT:               ISABELLA VIDLA  CSN #:                  [id number]  :                       10/1/1933  ADMIT DATE:       2024 7:15 PM  DISCH DATE:  RESPONDING  PROVIDER #:        West Estrella MD          QUERY TEXT:    Pt admitted with fractures  of  clavicle  and  pubis.  Pt noted to have   osteopenia. If possible, please document in progress notes and discharge   summary if you are evaluating and/or treating any of the following:    The medical record reflects the following:  Risk Factors: advanced  age;  Clinical Indicators: per  H&P  Treatment: calcium- cholecalciferol supplement ;  non  op  management    Thank you,    Martine Hamilton RN   CCDS  Options provided:  -- Pathological  fractures of  clavicle  and  pubis  -- Pathological  pubis  and  clavicle fractures due to osteopenia  -- Pathological  pubis  and  clavicle fractures due to osteopenia following   fall which would not usually break a normal, healthy bone  -- Traumatic  pubis  and  clavicle fractures  -- Other - I will add my own diagnosis  -- Disagree - Not applicable / Not valid  -- Disagree - Clinically unable to determine / Unknown  -- Refer to Clinical Documentation Reviewer    PROVIDER RESPONSE TEXT:    This patient has a pathological pubis and clavicle fractures due to osteopenia   following fall which would not usually break a normal, healthy bone.    Query created by: Katherine Hamilton on 2024 1:51 PM      Electronically signed by:  West Estrella MD 2024 10:11 AM

## 2024-08-07 NOTE — PROGRESS NOTES
[facility] SURGERY progress note      Chief Complaint: Right hip or pelvic pain    History of Present Illness:    Mr. Tyesha David Jr. is a 89 y.o. year old * male presents to emergency room after being transferred from [facility].  He fell down while he was trying to walk to the restroom on Sunday when he sustained a pain in his right hip area pain became worse that he could not even stand that prompted him to go to the emergency room at [place] where a CT scan of the abdomen pelvis showed right superior and inferior pubic rami fracture and left sacral wing fracture.  He was transferred over here for higher level of care.  Rest of the CT scan of the head neck chest were all within normal limits.  However CTA of the neck did show slight stenosis of the the origin of the right vertebral artery.  Patient denies any loss of consciousness, abdominal pain, nausea vomiting or seizures.  He is also on Eliquis.    No new complaints today.  Feels comfortable.  Worked with physical therapy.      Past Medical History:   Past Medical History:   Diagnosis Date    Arthritis     HTN (hypertension)        Past Surgical History: History reviewed. No pertinent surgical history.     Allergy:  Allergies   Allergen Reactions    Penicillins Anaphylaxis       Social History:  reports that he has never smoked. He has never used smokeless tobacco. He reports that he does not use drugs.     Family History:History reviewed. No pertinent family history.     Current Medications:  Current Facility-Administered Medications:     calcium carb-cholecalciferol 600-5 MG-MCG tablet 1 tablet, 1 tablet, Oral, Daily, Rafa Wilson MD, 1 tablet at 08/06/24 0948    lisinopril (PRINIVIL;ZESTRIL) tablet 20 mg, 20 mg, Oral, Daily, 20 mg at 08/06/24 1234 **AND** hydroCHLOROthiazide (HYDRODIURIL) tablet 12.5 mg, 12.5 mg, Oral, Daily, West Estrella MD, 12.5 mg at 08/06/24 1234    apixaban (ELIQUIS) tablet 5 mg, 5 mg, Oral, BID,

## 2024-08-08 LAB
ANION GAP SERPL CALC-SCNC: 6 MMOL/L (ref 5–15)
BASOPHILS # BLD: 0 K/UL (ref 0–0.1)
BASOPHILS NFR BLD: 1 % (ref 0–1)
BUN SERPL-MCNC: 23 MG/DL (ref 6–20)
BUN/CREAT SERPL: 21 (ref 12–20)
CA-I BLD-MCNC: 8.9 MG/DL (ref 8.5–10.1)
CHLORIDE SERPL-SCNC: 103 MMOL/L (ref 97–108)
CO2 SERPL-SCNC: 27 MMOL/L (ref 21–32)
CREAT SERPL-MCNC: 1.07 MG/DL (ref 0.7–1.3)
DIFFERENTIAL METHOD BLD: ABNORMAL
EOSINOPHIL # BLD: 0.5 K/UL (ref 0–0.4)
EOSINOPHIL NFR BLD: 7 % (ref 0–7)
ERYTHROCYTE [DISTWIDTH] IN BLOOD BY AUTOMATED COUNT: 12.7 % (ref 11.5–14.5)
GLUCOSE SERPL-MCNC: 122 MG/DL (ref 65–100)
HCT VFR BLD AUTO: 35.1 % (ref 36.6–50.3)
HGB BLD-MCNC: 11.9 G/DL (ref 12.1–17)
IMM GRANULOCYTES # BLD AUTO: 0 K/UL (ref 0–0.04)
IMM GRANULOCYTES NFR BLD AUTO: 1 % (ref 0–0.5)
LYMPHOCYTES # BLD: 1.1 K/UL (ref 0.8–3.5)
LYMPHOCYTES NFR BLD: 15 % (ref 12–49)
MCH RBC QN AUTO: 30.3 PG (ref 26–34)
MCHC RBC AUTO-ENTMCNC: 33.9 G/DL (ref 30–36.5)
MCV RBC AUTO: 89.3 FL (ref 80–99)
MONOCYTES # BLD: 0.8 K/UL (ref 0–1)
MONOCYTES NFR BLD: 11 % (ref 5–13)
NEUTS SEG # BLD: 4.8 K/UL (ref 1.8–8)
NEUTS SEG NFR BLD: 65 % (ref 32–75)
NRBC # BLD: 0 K/UL (ref 0–0.01)
NRBC BLD-RTO: 0 PER 100 WBC
PLATELET # BLD AUTO: 208 K/UL (ref 150–400)
PMV BLD AUTO: 11.5 FL (ref 8.9–12.9)
POTASSIUM SERPL-SCNC: 3.9 MMOL/L (ref 3.5–5.1)
RBC # BLD AUTO: 3.93 M/UL (ref 4.1–5.7)
SODIUM SERPL-SCNC: 136 MMOL/L (ref 136–145)
WBC # BLD AUTO: 7.2 K/UL (ref 4.1–11.1)

## 2024-08-08 PROCEDURE — 97530 THERAPEUTIC ACTIVITIES: CPT

## 2024-08-08 PROCEDURE — 80048 BASIC METABOLIC PNL TOTAL CA: CPT

## 2024-08-08 PROCEDURE — 6370000000 HC RX 637 (ALT 250 FOR IP): Performed by: INTERNAL MEDICINE

## 2024-08-08 PROCEDURE — 36415 COLL VENOUS BLD VENIPUNCTURE: CPT

## 2024-08-08 PROCEDURE — 2580000003 HC RX 258: Performed by: INTERNAL MEDICINE

## 2024-08-08 PROCEDURE — 85025 COMPLETE CBC W/AUTO DIFF WBC: CPT

## 2024-08-08 PROCEDURE — 1100000000 HC RM PRIVATE

## 2024-08-08 PROCEDURE — 6370000000 HC RX 637 (ALT 250 FOR IP): Performed by: PHYSICIAN ASSISTANT

## 2024-08-08 PROCEDURE — 99231 SBSQ HOSP IP/OBS SF/LOW 25: CPT

## 2024-08-08 PROCEDURE — 94761 N-INVAS EAR/PLS OXIMETRY MLT: CPT

## 2024-08-08 RX ADMIN — HYDROCHLOROTHIAZIDE 12.5 MG: 25 TABLET ORAL at 09:43

## 2024-08-08 RX ADMIN — Medication 1000 UNITS: at 09:43

## 2024-08-08 RX ADMIN — ASPIRIN 81 MG: 81 TABLET, COATED ORAL at 09:43

## 2024-08-08 RX ADMIN — SODIUM CHLORIDE, PRESERVATIVE FREE 10 ML: 5 INJECTION INTRAVENOUS at 09:43

## 2024-08-08 RX ADMIN — LISINOPRIL 20 MG: 20 TABLET ORAL at 09:43

## 2024-08-08 RX ADMIN — AMLODIPINE BESYLATE 10 MG: 5 TABLET ORAL at 09:43

## 2024-08-08 RX ADMIN — Medication 1 TABLET: at 09:43

## 2024-08-08 RX ADMIN — APIXABAN 5 MG: 5 TABLET, FILM COATED ORAL at 09:43

## 2024-08-08 ASSESSMENT — PAIN SCALES - WONG BAKER: WONGBAKER_NUMERICALRESPONSE: NO HURT

## 2024-08-08 ASSESSMENT — ENCOUNTER SYMPTOMS
COLOR CHANGE: 1
SORE THROAT: 0
SHORTNESS OF BREATH: 0

## 2024-08-08 ASSESSMENT — PAIN SCALES - GENERAL: PAINLEVEL_OUTOF10: 0

## 2024-08-08 NOTE — PROGRESS NOTES
OCCUPATIONAL THERAPY TREATMENT  Patient: Tyesha David Jr. (89 y.o. male)  Date: 8/8/2024  Primary Diagnosis: Fracture [T14.8XXA]       Precautions: Weight Bearing, Fall Risk, Bed Alarm Right Lower Extremity Weight Bearing: Weight Bearing As Tolerated Left Lower Extremity Weight Bearing: Weight Bearing As Tolerated   Left Upper Extremity Weight Bearing: Non Weight Bearing        Recommendations for nursing mobility: Out of bed to chair for meals, Encourage HEP in prep for ADLs/mobility; see handout for details, Use of BSC for toileting , AD and gt belt for bed to chair , and Assist x2    In place during session: External Catheter  Chart, occupational therapy assessment, plan of care, and goals were reviewed.  ASSESSMENT  Patient continues with skilled OT services and is progressing towards goals. Pt seated in recliner upon GAY arrival, agreeable to session. Pt A&O x 4. Pt tolerated recliner for 3 hours.  Pt no longer required co-tx w/ PT d/t increase in functional mobility and increased activity tolerance.  Pt required mod A x 1 and SBA x1 for sit to stand w/ aniket stedy for t/f to bed. Min A x1 for stand to sit.  Pt completed sts w/ rw and min/mod Ax1 and sba x1 and was able to tolerated standing 5-6 minutes w/o lob or sob. Pt returned to eob and completed UE therex, see grid below for details, to maintain/ increase strength and endurance to aid in adl performance. Pt returned to semi supine and completed light grooming. All known needs met. (See below for objective details and assist levels).     Overall pt tolerated session well today with rest breaks. Pt can benefit from 3 hours of therapy per day , 5 days per week. Pt was independent prior to last fall and is very motivated to return to PLOF.   Potential barriers for safe discharge: pts current support system is unable to meet their requirements for physical assistance, pt is a high fall risk, pt is not safe to be alone, and concern for pt safely navigating or  managing the home environment. Current OT recommendations for discharge Inpatient Rehabilitation Facility . Will continue to benefit from skilled OT services, and will continue to progress as tolerated.      Start of Session End of Session   SPO2 (%) 97 97   Heart Rate (BPM) 97 87     GOALS:    Problem: Occupational Therapy - Adult  Goal: By Discharge: Performs self-care activities at highest level of function for planned discharge setting.  See evaluation for individualized goals.  Description: FUNCTIONAL STATUS PRIOR TO ADMISSION:  Prior to recent admission, pt was independent with ADLs and was ambulating using a cane.    HOME SUPPORT: The patient lived with his wife without needing assistance.    Occupational Therapy Goals:  Initiated 8/6/2024  Patient/Family stated goal: return home  1.  Patient will perform grooming with Ashby within 7 day(s).  2.  Patient will perform bathing with Ashby within 7 day(s).  3.  Patient will perform upper body dressing with Ashby within 7 day(s).  4.  Patient will perform toilet transfers with Ashby  within 7 day(s).  5.  Patient will perform all aspects of toileting with Ashby within 7 day(s).  6.  Patient will participate in upper extremity therapeutic exercise/activities with Ashby for  within 7 day(s).  Outcome: Progressing        PLAN :  Patient continues to benefit from skilled intervention to address functional impairments. Continue treatment per established plan of care to address goals.    Recommend next OT session: standing grooming    Recommendation for discharge: (in order for the patient to meet his/her long term goals): Inpatient Rehabilitation Facility     IF patient discharges home will need the following DME: continuing to assess with progress     SUBJECTIVE:   Patient stated “Just tell me what I need to do and I will do it.”      OBJECTIVE DATA SUMMARY:   Cognitive/Behavioral Status:  Orientation  Overall Orientation

## 2024-08-08 NOTE — PROGRESS NOTES
Discharge paperwork started. Put in next dose due date. Print and send with patient. Waiting for placement. Spoke to primary nurse.

## 2024-08-08 NOTE — PLAN OF CARE
PHYSICAL THERAPY TREATMENT     Patient: Tyesha David Jr. (89 y.o. male)  Date: 8/8/2024  Diagnosis: Fracture [T14.8XXA] Fracture     Precautions: Weight Bearing, Fall Risk, Bed Alarm Right Lower Extremity Weight Bearing: Weight Bearing As Tolerated   Left Lower Extremity Weight Bearing: Weight Bearing As Tolerated     Left Upper Extremity Weight Bearing: Non Weight Bearing          Recommendations for nursing mobility: Out of bed to chair for meals, Encourage HEP in prep for ADLs/mobility; see handout for details, Frequent repositioning to prevent skin breakdown, Use of BSC for toileting , AD and gt belt for bed to chair , Amb to bathroom with AD and gait belt, Tatiana Stedy for bed to chair transfers , and Assist x1    In place during session: External Catheter  Chart, physical therapy assessment, plan of care and goals were reviewed.  ASSESSMENT  Patient continues with skilled PT services and is progressing towards goals. Pt supine in bed upon PT arrival, agreeable to session. (See below for objective details and assist levels).     Overall pt tolerated session fair today. Pt required less assistance to get to eob, mod Ax1. Pt sat eob and demonstrated good sitting balance. Pt performed seated LE therex with minimal complaints of pain and discomfort. Pt stood from bed with mod/max Ax1 with bed height elevated due to pt's height being 6'4\". Pt stood eob demonstrating fair standing balance before taking steps over to recliner chair. Pt required verbal cues for hand placement during transfers. Pt will require more assistance to stand up from recliner chair due to the seat being lower. Pt showed improvements with activity tolerance during today's session. Will continue to benefit from skilled PT services, and will continue to progress as tolerated. Potential barriers for safe discharge:. Current PT DC recommendation Inpatient Rehabilitation Facility  once medically appropriate.    GOALS:  Problem: Physical  Education  Education Given To: Patient  Education Provided: Role of Therapy;Plan of Care;Transfer Training;Equipment;Home Exercise Program;Energy Conservation;Precautions  Education Method: Verbal;Demonstration  Education Outcome: Verbalized understanding;Demonstrated understanding;Continued education needed    Maira Boateng PTA  Minutes: 31

## 2024-08-08 NOTE — CARE COORDINATION
DC Plan: [facility]    Shimon received a message via [employer] from Edith with [facility]. Their medical director indicated pt is low level at this time and is recommending SNF unless we can have her look at PT/OT notes for today to see if pt has progressed more past Mod Ax2.     Cm messaged PT via [employer].     Cm met with pt at the bedside and provided him with an update. Cm discussed a back up plan in case [facility] is still unable to accept. Pt really wants to go [place]. Pt called his wife - Brandie David. Cm spoke with pt's wife and provided her with an update. Cm discussed a back up plan. Cm received choices for [facility] and [facility].     ____________________________________________________________________    Shimon sent updated PT note to [facility]. SHIMON called and spoke with Edith [phone]. PT note will be sent to the medical director for review. Edith will get back with SHIMON for a decision.     1538    Shimon attempted to contact Edith at [facility]. Shimon left a voice message.     ____________________________________________________________________    Shimon received a returned call from Edith. No decision as of yet. If they accept, they do not have a bed today.     __________________________________________________________________    SHIMON provided pt and wife with an update.

## 2024-08-08 NOTE — PROGRESS NOTES
(ZOFRAN-ODT) disintegrating tablet 4 mg  4 mg Oral Q8H PRN    Or    ondansetron (ZOFRAN) injection 4 mg  4 mg IntraVENous Q6H PRN    polyethylene glycol (GLYCOLAX) packet 17 g  17 g Oral Daily PRN    acetaminophen (TYLENOL) tablet 650 mg  650 mg Oral Q6H PRN    Or    acetaminophen (TYLENOL) suppository 650 mg  650 mg Rectal Q6H PRN    HYDROcodone-acetaminophen (NORCO) 5-325 MG per tablet 1 tablet  1 tablet Oral Q4H PRN    HYDROcodone-acetaminophen (NORCO)  MG per tablet 1 tablet  1 tablet Oral Q4H PRN      Vitals:    08/07/24 2045 08/07/24 2141 08/08/24 0315 08/08/24 0931   BP: 125/65  (!) 144/71 (!) 142/58   Pulse: 70  83 76   Resp: 20 20 20 18   Temp: 98.1 °F (36.7 °C)  98.2 °F (36.8 °C) 98.4 °F (36.9 °C)   TempSrc: Oral  Oral Oral   SpO2:    91%   Weight:       Height:            Alert and oriented x3, No apparent distress    Physical Exam:  General: alert, cooperative, no acute distress.   HEENT: Normocephalic atraumatic  Gastrointestinal:  non-distended .    Cardiovascular: equal pulses in the lower extremities  Respiratory: No respiratory distress   Neurological: Neurovascular exam within normal limits.     Sensation intact: LE bilat.    Motor: + DF/PF/EHL.   Musculoskeletal: Examination of the bilateral lower extremity: No tenderness to palpation noted to the lateral, anterior, or posterior thighs, logrolling is negative bilaterally, minimal pain noted with flexion of the hips to 90 degrees left greater than right, calves soft, supple, non-tender upon palpation or with passive stretch.  Dorsiflexion/plantarflexion intact, neurovascularly intact  Examination of the left shoulder: There is bruising and ecchymosis noted along the anterior and superior aspect of the left shoulder.  Pain noted with range of motion of the shoulder along the distal clavicle.  No tenderness to palpation along the anterior clavicle, no tenderness to palpation along the proximal shoulder no tenderness with palpation of the elbow  or forearm, full range of motion of the elbow wrist and hand noted, patient able to range fingers without difficulty sensation is intact, neurovascularly intact, AIN, PIN, radial, ulnar, median nerve function intact      Labs:  CBC:  Recent Labs     08/06/24  0648 08/07/24  0746 08/08/24  0814   WBC 8.1 8.1 7.2   RBC 4.05* 3.84* 3.93*   HGB 12.3 11.8* 11.9*   HCT 36.9 35.6* 35.1*   MCV 91.1 92.7 89.3   RDW 13.2 13.1 12.7    149* 208       CHEMISTRIES:  Recent Labs     08/06/24  0648 08/07/24  0746 08/08/24  0814    134* 136   K 3.6 3.9 3.9    105 103   CO2 27 23 27   BUN 21* 26* 23*   GLUCOSE 122* 128* 122*     PT/INR:No results for input(s): \"PROTIME\", \"INR\" in the last 72 hours.  APTT:No results for input(s): \"APTT\" in the last 72 hours.  LIVER PROFILE:  No results for input(s): \"AST\", \"ALT\", \"BILIDIR\", \"BILITOT\", \"ALKPHOS\" in the last 72 hours.      IMAGING:  EXAM: XR CLAVICLE LEFT     INDICATION: left clavicle fracture noted on CT c-spine.     COMPARISON: None.     FINDINGS: Two views of the left clavicle demonstrate an obliquely oriented  distal clavicular fracture which was not well visualized on the previous study..     IMPRESSION:  Obliquely oriented relatively nondisplaced distal clavicular  fracture.     PROCEDURE: XR PELVIS (MIN 3 VIEWS)     COMPARISON: None     REASON FOR STUDY: CT dated 8/5/2024     FINDINGS: [place], outlet, and bilateral oblique views of the pelvis were  obtained. Right inferior and superior pubic rami fractures are again noted.  These are relatively nondisplaced.  Bilateral hip arthroplasties.     IMPRESSION:  Fractures of right superior and inferior pubic rami with limited  visualization       Assessment/Plan:   Patient is a 89 year old male with acute right superior and inferior pubic rami and left sacral wing fractures and left distal clavicle fracture following a ground level fall.  Continued PT/OT at this time.  Orthopedically stable at this time.      -

## 2024-08-08 NOTE — DISCHARGE SUMMARY
Discharge Summary    Name: Tyesha David Jr.  [id number]  YOB: 1933 (Age: 89 y.o.)   Date of Admission: 8/5/2024  Date of Discharge: 8/8/2024  Attending Physician: West Estrella MD    Discharge Diagnosis:   Principal Problem:    Fracture  Active Problems:    Displaced fracture of shaft of left clavicle, initial encounter for closed fracture    Fracture of pubic ramus (HCC)    Vitamin D deficiency    Adrenal nodule (HCC)    HTN (hypertension)  Resolved Problems:    * No resolved hospital problems. *       Consultations:  IP CONSULT TO TRAUMA SURGERY  IP CONSULT TO HOSPITALIST  IP CONSULT TO ORTHOPEDIC SURGERY    Brief Hospital Course by Main Problems:   Tyesha David Jr. is a 89 y.o. male with PMH of hypertension Presented to the ED with chief complaint of hip pain after fall.  Patient had a fall on Sunday, leading to left shoulder pain. Pain persisted which prompted him to come to the ED in [place]. CT scan of the abdomen pelvis showed right superior and inferior pubic rami fracture and left sacral wing fracture. In the ED, vital signs stable. Noted hypoxic when patient is sleeping, placed on 2L NC.  Lab work grossly within normal limits.  Chest x-ray showed extensive bilateral calcified pleural plaque. CT showed acute left clavicle fracture.  Also has severe diffuse osteopenia.  Patient was admitted to the hospital for further evaluation and management.  Orthopedic surgery recommended nonoperative management for both pubic rami and left sacral wing fractures as well as left clavicular shaft fracture.  Repeat x-ray left clavicle revealed obliquely oriented relatively nondisplaced distal clavicle nuclear fracture.  XR pelvis revealed fractures of right superior and inferior pubic rami with limited visualization.  Patient will need to follow-up with orthopedic surgery in 2 weeks, can apply ice to left shoulder as needed, sling as needed for comfort, WBAT LUE and  MD Kaur Cordero Sr., Paul A Sr., MD  [address]   [facility] [id number]  [phone]    Follow up in 1 week(s)  Follow-up for recent hospital mission.  Patient will need recheck of CBC, BMP within 1 to 2 weeks    [facility] EMERGENCY DEP  [address] [place] [id number] [phone]  Follow up  As needed, If symptoms worsen    Terry Roberts MD  [address] [place] [id number] [phone]    Follow up in 2 week(s)  Follow-up for continued outpatient orthopedic care          Total time in minutes spent coordinating this discharge (includes going over instructions, follow-up, prescriptions, and preparing report for sign off to her PCP) :  35 minutes

## 2024-08-09 VITALS
WEIGHT: 233 LBS | HEART RATE: 69 BPM | TEMPERATURE: 97.9 F | SYSTOLIC BLOOD PRESSURE: 147 MMHG | HEIGHT: 76 IN | RESPIRATION RATE: 16 BRPM | DIASTOLIC BLOOD PRESSURE: 62 MMHG | OXYGEN SATURATION: 97 % | BODY MASS INDEX: 28.37 KG/M2

## 2024-08-09 PROCEDURE — 6370000000 HC RX 637 (ALT 250 FOR IP): Performed by: INTERNAL MEDICINE

## 2024-08-09 PROCEDURE — 6370000000 HC RX 637 (ALT 250 FOR IP): Performed by: PHYSICIAN ASSISTANT

## 2024-08-09 PROCEDURE — 2580000003 HC RX 258: Performed by: INTERNAL MEDICINE

## 2024-08-09 PROCEDURE — 97530 THERAPEUTIC ACTIVITIES: CPT

## 2024-08-09 RX ADMIN — HYDROCODONE BITARTRATE AND ACETAMINOPHEN 1 TABLET: 10; 325 TABLET ORAL at 13:32

## 2024-08-09 RX ADMIN — ONDANSETRON 4 MG: 4 TABLET, ORALLY DISINTEGRATING ORAL at 13:32

## 2024-08-09 RX ADMIN — APIXABAN 5 MG: 5 TABLET, FILM COATED ORAL at 08:53

## 2024-08-09 RX ADMIN — HYDROCHLOROTHIAZIDE 12.5 MG: 25 TABLET ORAL at 08:54

## 2024-08-09 RX ADMIN — AMLODIPINE BESYLATE 10 MG: 5 TABLET ORAL at 08:53

## 2024-08-09 RX ADMIN — Medication 1 TABLET: at 08:53

## 2024-08-09 RX ADMIN — SODIUM CHLORIDE, PRESERVATIVE FREE 10 ML: 5 INJECTION INTRAVENOUS at 00:56

## 2024-08-09 RX ADMIN — HYDROCODONE BITARTRATE AND ACETAMINOPHEN 1 TABLET: 10; 325 TABLET ORAL at 08:55

## 2024-08-09 RX ADMIN — LISINOPRIL 20 MG: 20 TABLET ORAL at 08:53

## 2024-08-09 RX ADMIN — Medication 1000 UNITS: at 08:54

## 2024-08-09 RX ADMIN — SODIUM CHLORIDE, PRESERVATIVE FREE 10 ML: 5 INJECTION INTRAVENOUS at 08:57

## 2024-08-09 RX ADMIN — ASPIRIN 81 MG: 81 TABLET, COATED ORAL at 08:54

## 2024-08-09 RX ADMIN — HYDROCODONE BITARTRATE AND ACETAMINOPHEN 1 TABLET: 5; 325 TABLET ORAL at 00:56

## 2024-08-09 ASSESSMENT — PAIN SCALES - GENERAL
PAINLEVEL_OUTOF10: 8
PAINLEVEL_OUTOF10: 8
PAINLEVEL_OUTOF10: 7
PAINLEVEL_OUTOF10: 8

## 2024-08-09 ASSESSMENT — PAIN DESCRIPTION - LOCATION
LOCATION: HIP;SHOULDER;NECK
LOCATION: GENERALIZED

## 2024-08-09 ASSESSMENT — PAIN DESCRIPTION - DESCRIPTORS
DESCRIPTORS: ACHING
DESCRIPTORS: ACHING

## 2024-08-09 ASSESSMENT — PAIN DESCRIPTION - ORIENTATION: ORIENTATION: LEFT;RIGHT

## 2024-08-09 NOTE — PLAN OF CARE
OCCUPATIONAL THERAPY TREATMENT  Patient: Tyesha David Jr. (89 y.o. male)  Date: 8/9/2024  Primary Diagnosis: Fracture [T14.8XXA]       Precautions: Weight Bearing, Fall Risk, Bed Alarm Right Lower Extremity Weight Bearing: Weight Bearing As Tolerated Left Lower Extremity Weight Bearing: Weight Bearing As Tolerated   Left Upper Extremity Weight Bearing: Non Weight Bearing        Recommendations for nursing mobility: Out of bed to chair for meals, Encourage HEP in prep for ADLs/mobility; see handout for details, Use of BSC for toileting , Tatiana Stedy for bed to chair transfers , and Assist x1    In place during session: External Catheter  Chart, occupational therapy assessment, plan of care, and goals were reviewed.  ASSESSMENT  Patient continues with skilled OT services and is progressing towards goals. Pt presented in recliner upon GAY arrival, agreeable to session. Pt A&O x 4. Pt required assistance to scoot to edge of chair and min A x2 to stand secondary very low surface.  Pt stood for ~ 10 minutes able to reach items on bedside table. Completed in prep for standing grooming tasks. Pt attempted to doff/don non-skid slipper socks but was limited due to pain.  Therapist educated pt on AE for LB dressing, pt verbalized he was familiar with equipment. Pt left in recliner with all needs met. (See below for objective details and assist levels).     Overall pt tolerated session fair today. Pain limiting ability to ambulate into bathroom and limiting LB dressing. Pt with increase standing tolerance this date.  Pt continues to benefit from skilled OT to increase safety and independence with basic self care and functional mobility.  Potential barriers for safe discharge: pts current support system is unable to meet their requirements for physical assistance, pt is a high fall risk, pt is not safe to be alone, and concern for pt safely navigating or managing the home environment. Current OT recommendations for discharge  Moderate assistance;Assist X1;Additional time    Transfers:  Transfer Training  Transfer Training: Yes  Sit to Stand: Minimum assistance;Assist X2;Other (comment) (low surface)  Stand to Sit: Moderate assistance;Assist X1      Balance:  Balance  Sitting - Static: Good (unsupported)  Sitting - Dynamic: Good (unsupported)  Standing: Impaired  Standing - Static: Fair;Constant support      ADL Intervention:      Grooming: Setup   Grooming Skilled Clinical Factors: cleaning dentures, facial and hand hygiene      LE Dressing: Maximum assistance  LE Dressing Skilled Clinical Factors: doffing/donning non-skid  slipper socks         Pain Ratin/10   Pain Intervention(s):   patient medicated for pain prior to session    Activity Tolerance:   Fair     After treatment patient left in no apparent distress:   Bed locked and returned to lowest position, Patient left in no apparent distress sitting up in chair, Call bell within reach, and Updated patient's board on functional status and mobility recommendations, and nsg updated     COMMUNICATION/EDUCATION:   The patient’s plan of care was discussed with: Physical therapy assistant and Registered nurse    Patient Education  Education Given To: Patient  Education Provided: Plan of Care;ADL Adaptive Strategies;Transfer Training  Education Provided Comments: AE to assist with LB dressing  Education Method: Verbal  Barriers to Learning: None  Education Outcome: Verbalized understanding    Thank you for this referral.  MONY Aguilar  Minutes: 44

## 2024-08-09 NOTE — PLAN OF CARE
PHYSICAL THERAPY TREATMENT     Patient: Tyesha David Jr. (89 y.o. male)  Date: 8/9/2024  Diagnosis: Fracture [T14.8XXA] Fracture      Precautions: Weight Bearing, Fall Risk, Bed Alarm Right Lower Extremity Weight Bearing: Weight Bearing As Tolerated   Left Lower Extremity Weight Bearing: Weight Bearing As Tolerated     Left Upper Extremity Weight Bearing: Non Weight Bearing          Recommendations for nursing mobility: Out of bed to chair for meals, AD and gt belt for bed to chair , Amb to bathroom with AD and gait belt, and Assist x1    In place during session: External Catheter  Chart, physical therapy assessment, plan of care and goals were reviewed.  ASSESSMENT  Patient continues with skilled PT services and is progressing towards goals. Pt supine in bed upon PT arrival, agreeable to session. (See below for objective details and assist levels).     Overall pt tolerated session fair today. Pt completed transfers min/mod A. Pt able to stand without the height of bed raised. Pt ambulated ~6ft with RW. Gait was slow and fairly steady with no lob or knee buckling noted. Pt performed seated therex with no complaints of pain or discomfort. Pt left sitting up in chair with all needs met and GAY entering room for tx session. Will continue to benefit from skilled PT services, and will continue to progress as tolerated. Potential barriers for safe discharge:. Current PT DC recommendation Inpatient Rehabilitation Facility  once medically appropriate.    GOALS:  Problem: Physical Therapy - Adult  Goal: By Discharge: Performs mobility at highest level of function for planned discharge setting.  See evaluation for individualized goals.  Description: FUNCTIONAL STATUS PRIOR TO ADMISSION: Patient was modified independent using a single point cane for functional mobility.    HOME SUPPORT PRIOR TO ADMISSION: The patient lived with spouse but did not require assistance.    Physical Therapy Goals  Initiated 8/6/2024  Pt

## 2024-08-09 NOTE — CARE COORDINATION
DC Plan: [facility]       Room# 185       Report: [phone]       Transport: stretcher 1:45pm    Cm received a phone call from chirag Sharmaison for [facility]. [facility] is unable to accept at this time. They are still recommending SNF level of care. Caleb was informed pt has the option to go from SNF to IRF. Pt would need to let the SNF know he wants a referral sent to [facility] and they can review his information to see if they would be able to accept or not.     Caleb spoke with Janes with [facility]. Caleb asked him if pt can be transferred from SNF to IRF. Janes confirmed they would be able to do that.     Cm met with pt at the bedside. Pt called his wife - Brandie David and placed her on speaker phone. Cm updated them on denial for IRF. Cm explained to them [facility] accepted. Cm informed them the option to get transferred from SNF to IRF. Cm explained to them what they would need to do if they decide to go that route. Cm offered reaching out to other IRF's, however pt's wife expressed concern with distance and the ability to visit her . After much discussion, pt was agreeable with going to [facility]. Cm explained to pt and wife the right to appeal the discharge. Pt declined appealing his discharge. Pt indicated he is ready to discharge and start getting therapy. Wife would like to speak with [facility] liaison. Cm informed her writer will reach out.     Cm updated pt's nurse.     Caleb reached out to Edith with [employer] and asked her to contact pt's wife.     Ediht called writer back. Edith spoke with pt's wife.     Transportation arranged through [facility]. ETA 1:45pm     Cm attempted to contact pt's wife - Brandie David [phone] to update her on transport  time. Phone line was busy.     Caleb called pt's wife again. Cm updated her on transport.     Cm received message from [facility]. Room# was changed to 185.    Transition of Care

## 2024-08-09 NOTE — DISCHARGE SUMMARY
Discharge Summary    Name: Tyesha David Jr.  [id number]  YOB: 1933 (Age: 89 y.o.)   Date of Admission: 8/5/2024  Date of Discharge: 8/9/2024  Attending Physician: West Estrella MD    Discharge Diagnosis:   Principal Problem:    Fracture  Active Problems:    Displaced fracture of shaft of left clavicle, initial encounter for closed fracture    Fracture of pubic ramus (HCC)    Vitamin D deficiency    Adrenal nodule (HCC)    HTN (hypertension)  Resolved Problems:    * No resolved hospital problems. *       Consultations:  IP CONSULT TO TRAUMA SURGERY  IP CONSULT TO HOSPITALIST  IP CONSULT TO ORTHOPEDIC SURGERY    Brief Hospital Course by Main Problems:   Tyesha David Jr. is a 89 y.o. male with PMH of hypertension Presented to the ED with chief complaint of hip pain after fall.  Patient had a fall on Sunday, leading to left shoulder pain. Pain persisted which prompted him to come to the ED in [place]. CT scan of the abdomen pelvis showed right superior and inferior pubic rami fracture and left sacral wing fracture. In the ED, vital signs stable. Noted hypoxic when patient is sleeping, placed on 2L NC.  Lab work grossly within normal limits.  Chest x-ray showed extensive bilateral calcified pleural plaque. CT showed acute left clavicle fracture.  Also has severe diffuse osteopenia.  Patient was admitted to the hospital for further evaluation and management.  Orthopedic surgery recommended nonoperative management for both pubic rami and left sacral wing fractures as well as left clavicular shaft fracture.  Repeat x-ray left clavicle revealed obliquely oriented relatively nondisplaced distal clavicle nuclear fracture.  XR pelvis revealed fractures of right superior and inferior pubic rami with limited visualization.  Patient will need to follow-up with orthopedic surgery in 2 weeks, can apply ice to left shoulder as needed, sling as needed for comfort, WBAT LUE and

## 2024-08-09 NOTE — PROGRESS NOTES
.Discharge plan of care/case management plan validated with provider discharge order.     Report called given to Lyudmila Villasenor LPN at [facility]. All questions and concerns addressed fully.    Patient taken on the stretcher by the transport team [facility]. Discharge instruction and patient belonging handed over to the team.

## 2024-08-13 ENCOUNTER — APPOINTMENT (OUTPATIENT)
Facility: HOSPITAL | Age: 89
End: 2024-08-13
Payer: MEDICARE

## 2024-08-13 ENCOUNTER — HOSPITAL ENCOUNTER (EMERGENCY)
Facility: HOSPITAL | Age: 89
Discharge: NURSING, REHAB OR LONG-TERM CARE | End: 2024-08-14
Attending: EMERGENCY MEDICINE
Payer: MEDICARE

## 2024-08-13 DIAGNOSIS — S42.035D NONDISPLACED FRACTURE OF LATERAL END OF LEFT CLAVICLE, SUBSEQUENT ENCOUNTER FOR FRACTURE WITH ROUTINE HEALING: Primary | ICD-10-CM

## 2024-08-13 PROCEDURE — 73030 X-RAY EXAM OF SHOULDER: CPT

## 2024-08-13 PROCEDURE — 99283 EMERGENCY DEPT VISIT LOW MDM: CPT

## 2024-08-14 VITALS
HEART RATE: 80 BPM | BODY MASS INDEX: 28.36 KG/M2 | SYSTOLIC BLOOD PRESSURE: 164 MMHG | HEIGHT: 76 IN | DIASTOLIC BLOOD PRESSURE: 65 MMHG | RESPIRATION RATE: 20 BRPM | TEMPERATURE: 98.2 F | OXYGEN SATURATION: 93 %

## 2024-08-14 ASSESSMENT — ENCOUNTER SYMPTOMS
RESPIRATORY NEGATIVE: 1
GASTROINTESTINAL NEGATIVE: 1

## 2024-08-14 NOTE — ED NOTES
Patient stable at time of discharge. Report called to home facility. Nurse at facility verbalized understanding. No questions stated at this time.   
Set up discharge with [facility].   
(electronically signed)  Attending Emergency Physician           Neisha, Sami DODD MD  08/14/24 1388

## 2024-08-14 NOTE — ED TRIAGE NOTES
Patient presents from [facility]. Patient states he felt a pop while lowering him self to toilet with CNA. Patient new to facility after fall at home. Some busing noted. Facility states that busing is not new.     Patient had previous limited ROM to that extremity due to previous injury.

## 2024-09-06 ENCOUNTER — HOSPITAL ENCOUNTER (EMERGENCY)
Facility: HOSPITAL | Age: 89
Discharge: HOME | End: 2024-09-06
Attending: EMERGENCY MEDICINE
Payer: MEDICARE

## 2024-09-06 ENCOUNTER — APPOINTMENT (OUTPATIENT)
Facility: HOSPITAL | Age: 89
End: 2024-09-06
Payer: MEDICARE

## 2024-09-06 VITALS
OXYGEN SATURATION: 99 % | HEIGHT: 76 IN | HEART RATE: 77 BPM | SYSTOLIC BLOOD PRESSURE: 155 MMHG | BODY MASS INDEX: 27.16 KG/M2 | TEMPERATURE: 97.5 F | WEIGHT: 223 LBS | RESPIRATION RATE: 18 BRPM | DIASTOLIC BLOOD PRESSURE: 74 MMHG

## 2024-09-06 DIAGNOSIS — N39.0 UTI (URINARY TRACT INFECTION), UNCOMPLICATED: Primary | ICD-10-CM

## 2024-09-06 LAB
ALBUMIN SERPL-MCNC: 3.6 G/DL (ref 3.5–5)
ALBUMIN/GLOB SERPL: 1.1 (ref 1.1–2.2)
ALP SERPL-CCNC: 192 U/L (ref 45–117)
ALT SERPL-CCNC: 8 U/L (ref 12–78)
ANION GAP SERPL CALC-SCNC: 12 MMOL/L (ref 2–12)
APPEARANCE UR: CLEAR
AST SERPL W P-5'-P-CCNC: 23 U/L (ref 15–37)
BACTERIA URNS QL MICRO: ABNORMAL /HPF
BASOPHILS # BLD: 0.1 K/UL (ref 0–0.1)
BASOPHILS NFR BLD: 1 % (ref 0–1)
BILIRUB SERPL-MCNC: 0.8 MG/DL (ref 0.2–1)
BILIRUB UR QL: NEGATIVE
BUN SERPL-MCNC: 23 MG/DL (ref 6–20)
BUN/CREAT SERPL: 21 (ref 12–20)
CA-I BLD-MCNC: 9 MG/DL (ref 8.5–10.1)
CHLORIDE SERPL-SCNC: 101 MMOL/L (ref 97–108)
CO2 SERPL-SCNC: 24 MMOL/L (ref 21–32)
COLOR UR: ABNORMAL
CREAT SERPL-MCNC: 1.11 MG/DL (ref 0.7–1.3)
DIFFERENTIAL METHOD BLD: ABNORMAL
EOSINOPHIL # BLD: 0.3 K/UL (ref 0–0.4)
EOSINOPHIL NFR BLD: 3 % (ref 0–7)
ERYTHROCYTE [DISTWIDTH] IN BLOOD BY AUTOMATED COUNT: 12.8 % (ref 11.5–14.5)
FLUAV RNA SPEC QL NAA+PROBE: NOT DETECTED
FLUBV RNA SPEC QL NAA+PROBE: NOT DETECTED
GLOBULIN SER CALC-MCNC: 3.2 G/DL (ref 2–4)
GLUCOSE SERPL-MCNC: 117 MG/DL (ref 65–100)
GLUCOSE UR STRIP.AUTO-MCNC: NEGATIVE MG/DL
HCT VFR BLD AUTO: 38.3 % (ref 36.6–50.3)
HGB BLD-MCNC: 13 G/DL (ref 12.1–17)
HGB UR QL STRIP: ABNORMAL
IMM GRANULOCYTES # BLD AUTO: 0.1 K/UL (ref 0–0.04)
IMM GRANULOCYTES NFR BLD AUTO: 1 % (ref 0–0.5)
KETONES UR QL STRIP.AUTO: 15 MG/DL
LEUKOCYTE ESTERASE UR QL STRIP.AUTO: ABNORMAL
LYMPHOCYTES # BLD: 1.1 K/UL (ref 0.8–3.5)
LYMPHOCYTES NFR BLD: 12 % (ref 12–49)
MCH RBC QN AUTO: 30.4 PG (ref 26–34)
MCHC RBC AUTO-ENTMCNC: 33.9 G/DL (ref 30–36.5)
MCV RBC AUTO: 89.5 FL (ref 80–99)
MONOCYTES # BLD: 0.7 K/UL (ref 0–1)
MONOCYTES NFR BLD: 8 % (ref 5–13)
NEUTS SEG # BLD: 6.5 K/UL (ref 1.8–8)
NEUTS SEG NFR BLD: 75 % (ref 32–75)
NITRITE UR QL STRIP.AUTO: POSITIVE
NRBC # BLD: 0 K/UL (ref 0–0.01)
NRBC BLD-RTO: 0 PER 100 WBC
PH UR STRIP: 6 (ref 5–8)
PLATELET # BLD AUTO: 233 K/UL (ref 150–400)
PMV BLD AUTO: 11.4 FL (ref 8.9–12.9)
POTASSIUM SERPL-SCNC: 4.1 MMOL/L (ref 3.5–5.1)
PROT SERPL-MCNC: 6.8 G/DL (ref 6.4–8.2)
PROT UR STRIP-MCNC: ABNORMAL MG/DL
RBC # BLD AUTO: 4.28 M/UL (ref 4.1–5.7)
RBC #/AREA URNS HPF: ABNORMAL /HPF (ref 0–3)
RBC MORPH BLD: ABNORMAL
SARS-COV-2 RNA RESP QL NAA+PROBE: NOT DETECTED
SODIUM SERPL-SCNC: 137 MMOL/L (ref 136–145)
SP GR UR REFRACTOMETRY: 1.02 (ref 1–1.03)
URINE CULTURE IF INDICATED: ABNORMAL
UROBILINOGEN UR QL STRIP.AUTO: 0.2 EU/DL (ref 0.2–1)
WBC # BLD AUTO: 8.8 K/UL (ref 4.1–11.1)
WBC URNS QL MICRO: ABNORMAL /HPF (ref 0–5)

## 2024-09-06 PROCEDURE — 85025 COMPLETE CBC W/AUTO DIFF WBC: CPT

## 2024-09-06 PROCEDURE — 6370000000 HC RX 637 (ALT 250 FOR IP): Performed by: EMERGENCY MEDICINE

## 2024-09-06 PROCEDURE — 81001 URINALYSIS AUTO W/SCOPE: CPT

## 2024-09-06 PROCEDURE — 99284 EMERGENCY DEPT VISIT MOD MDM: CPT

## 2024-09-06 PROCEDURE — 87186 SC STD MICRODIL/AGAR DIL: CPT

## 2024-09-06 PROCEDURE — 80053 COMPREHEN METABOLIC PANEL: CPT

## 2024-09-06 PROCEDURE — 87086 URINE CULTURE/COLONY COUNT: CPT

## 2024-09-06 PROCEDURE — 87088 URINE BACTERIA CULTURE: CPT

## 2024-09-06 PROCEDURE — 36415 COLL VENOUS BLD VENIPUNCTURE: CPT

## 2024-09-06 PROCEDURE — 70450 CT HEAD/BRAIN W/O DYE: CPT

## 2024-09-06 PROCEDURE — 87636 SARSCOV2 & INF A&B AMP PRB: CPT

## 2024-09-06 RX ORDER — LEVOFLOXACIN 750 MG/1
750 TABLET, FILM COATED ORAL
Status: COMPLETED | OUTPATIENT
Start: 2024-09-06 | End: 2024-09-06

## 2024-09-06 RX ORDER — LEVOFLOXACIN 500 MG/1
500 TABLET, FILM COATED ORAL DAILY
Qty: 7 TABLET | Refills: 0 | Status: SHIPPED | OUTPATIENT
Start: 2024-09-06 | End: 2024-09-13

## 2024-09-06 RX ADMIN — LEVOFLOXACIN 750 MG: 750 TABLET, FILM COATED ORAL at 13:53

## 2024-09-06 ASSESSMENT — PAIN SCALES - GENERAL: PAINLEVEL_OUTOF10: 0

## 2024-09-06 NOTE — ED TRIAGE NOTES
Sent from NH with reports of being lethargic and change in mentation and not waking up when attempts made by NH staff, on arrival to ER pt A&Ox4, denies complaints,

## 2024-09-06 NOTE — ED NOTES
Attempted x2 to call [facility] to give report for patient being transferred back to their facility, both times line was busy.

## 2024-09-08 LAB
BACTERIA SPEC CULT: ABNORMAL
COLONY COUNT, CNT: ABNORMAL
Lab: ABNORMAL

## 2025-05-13 ENCOUNTER — OFFICE VISIT (OUTPATIENT)
Age: 89
End: 2025-05-13
Payer: MEDICARE

## 2025-05-13 VITALS
BODY MASS INDEX: 28.71 KG/M2 | HEART RATE: 96 BPM | SYSTOLIC BLOOD PRESSURE: 124 MMHG | WEIGHT: 235.8 LBS | DIASTOLIC BLOOD PRESSURE: 80 MMHG | OXYGEN SATURATION: 97 % | HEIGHT: 76 IN | RESPIRATION RATE: 18 BRPM

## 2025-05-13 DIAGNOSIS — H61.23 IMPACTED CERUMEN, BILATERAL: Primary | ICD-10-CM

## 2025-05-13 PROCEDURE — 69210 REMOVE IMPACTED EAR WAX UNI: CPT | Performed by: OTOLARYNGOLOGY

## 2025-05-13 RX ORDER — PREDNISONE 5 MG/1
5 TABLET ORAL DAILY
COMMUNITY
Start: 2024-11-13 | End: 2026-04-07

## 2025-05-13 NOTE — PROGRESS NOTES
Tyesha Shepherdigsamra Ayers.  10/1/1933  [id number]    5/13/2025      Hx of bilateral cerumen impactions, last seen 1 year ago  Has hearing aids with outside audiologist    PROCEDURE: BILATERAL CERUMEN DEBRIDEMENT    Using the headlight and otoscope both ears were examined. Mineral oil, a 5 Fr suction and alligator were used to debride the right ear of cerumen revealing a clear and intact TM bilaterally. Patient tolerated the procedure well     A/P   Cerumen impactions  Hearing loss  R ear complete impaction debrided  Follow up 6-9 mo    Paula Franco MD   [facility]  [address]  [place] [address]  Office Phone: [phone]
